# Patient Record
Sex: MALE | Race: BLACK OR AFRICAN AMERICAN | Employment: FULL TIME | URBAN - METROPOLITAN AREA
[De-identification: names, ages, dates, MRNs, and addresses within clinical notes are randomized per-mention and may not be internally consistent; named-entity substitution may affect disease eponyms.]

---

## 2017-03-03 ENCOUNTER — HOSPITAL ENCOUNTER (EMERGENCY)
Age: 52
Discharge: HOME | End: 2017-03-03
Attending: EMERGENCY MEDICINE | Admitting: EMERGENCY MEDICINE
Payer: COMMERCIAL

## 2017-03-03 VITALS
SYSTOLIC BLOOD PRESSURE: 164 MMHG | DIASTOLIC BLOOD PRESSURE: 95 MMHG | HEART RATE: 76 BPM | TEMPERATURE: 97.5 F | RESPIRATION RATE: 18 BRPM

## 2017-03-03 DIAGNOSIS — J02.9 SORE THROAT: ICD-10-CM

## 2017-03-03 DIAGNOSIS — R09.81 NASAL CONGESTION: Primary | ICD-10-CM

## 2017-03-03 DIAGNOSIS — R05.9 COUGH: ICD-10-CM

## 2017-03-03 PROCEDURE — 99282 EMERGENCY DEPT VISIT SF MDM: CPT

## 2017-03-03 RX ORDER — LORATADINE 10 MG/1
10 TABLET ORAL
Qty: 30 TAB | Refills: 1 | Status: SHIPPED | OUTPATIENT
Start: 2017-03-03 | End: 2019-09-10

## 2017-03-03 NOTE — ED PROVIDER NOTES
HPI Comments: 55yo male with history of HTN, DM, GERD, environmental allergies presents to ER complaining of sore throat that started 2 days ago stating he awoke with symptoms. Sore throat improved throughout the day but yesterday am and this am sore throat worse. Patient states he had to gargle with warm salt water this am.  Admits to nasal congestion starting several days prior to sore throat. +/- cough. Denies any body aches, fever, chills. Denies gland swelling. Patient is a 49 y.o. male presenting with sore throat. Sore Throat    Associated symptoms include cough. Pertinent negatives include no congestion, no ear pain, no headaches, no shortness of breath and no trouble swallowing. Past Medical History:   Diagnosis Date    Chest pain     GERD (gastroesophageal reflux disease)     HLD (hyperlipidemia)     HTN (hypertension) 6/30/2011       No past surgical history on file. Family History:   Problem Relation Age of Onset    Hypertension Maternal Grandmother     Cancer Maternal Grandmother     Cancer Mother     Hypertension Sister     Hypertension Brother     Cancer Maternal Grandfather        Social History     Social History    Marital status: SINGLE     Spouse name: N/A    Number of children: N/A    Years of education: N/A     Occupational History    Not on file. Social History Main Topics    Smoking status: Former Smoker     Quit date: 5/1/2016    Smokeless tobacco: Never Used    Alcohol use 2.5 oz/week     5 Cans of beer per week    Drug use: No    Sexual activity: Yes     Partners: Female     Birth control/ protection: Condom     Other Topics Concern    Not on file     Social History Narrative    Works currently-[employer], [employer]. ALLERGIES: Citrus and derivatives; Iodine; Other medication; and Pcn [penicillins]    Review of Systems   Constitutional: Negative for activity change, appetite change, chills, fatigue and fever.    HENT: Positive for rhinorrhea, sneezing and sore throat. Negative for congestion, dental problem, ear pain, facial swelling, mouth sores, nosebleeds, postnasal drip, sinus pressure, trouble swallowing and voice change. Eyes: Negative for pain, discharge, redness and itching. Respiratory: Positive for cough. Negative for shortness of breath and wheezing. Cardiovascular: Negative. Gastrointestinal: Negative. Musculoskeletal: Negative. Skin: Negative. Neurological: Negative for headaches. Hematological: Negative for adenopathy. All other systems reviewed and are negative. Vitals:    03/03/17 0633   BP: (!) 164/95   Pulse: 76   Resp: 18   Temp: 97.5 °F (36.4 °C)            Physical Exam   Constitutional: He is oriented to person, place, and time. He appears well-developed. No distress. HENT:   Right Ear: Tympanic membrane and external ear normal.   Left Ear: Tympanic membrane and external ear normal.   Mouth/Throat: Oropharynx is clear and moist.   Patient sounds nasally. No active drainage. +/- sniffles. Nasal mucosa appears slightly red and dry. Septal nasal mucosa appears slightly friable. Eyes: EOM are normal. Pupils are equal, round, and reactive to light. Right eye exhibits no discharge. Left eye exhibits no discharge. Right conjunctiva is injected. Left conjunctiva is injected. Neck: Normal range of motion. Neck supple. Cardiovascular: Normal rate, regular rhythm and normal heart sounds. Pulmonary/Chest: Effort normal and breath sounds normal.   Musculoskeletal: Normal range of motion. Lymphadenopathy:     He has no cervical adenopathy. Neurological: He is alert and oriented to person, place, and time. Skin: He is not diaphoretic. Psychiatric: He has a normal mood and affect. Nursing note and vitals reviewed.        MDM  Number of Diagnoses or Management Options  Cough:   Nasal congestion:   Sore throat:   Diagnosis management comments: 53yo male presents to ER with sore throat x 3 days, worse in the am.  Admits to nasal congestion prior to sore throat. +/- cough and intermittently productive. No viral prodrome reported. I have suspicion symptoms could be related to allergies especially given the warm, dry, windy weather for last 3-4 days. Rx for Claritin. Work note for today.   PCP referral.     ED Course       Procedures

## 2017-03-03 NOTE — LETTER
NOTIFICATION RETURN TO WORK / SCHOOL 
 
3/3/2017 6:57 AM 
 
Mr. Razia Loza [address] [id number] To Whom It May Concern: 
 
Razia Loza is currently under the care of [facility] EMERGENCY DEPT. He will return to work/school on: 03/4/17 If there are questions or concerns please have the patient contact our office.  
 
 
 
Sincerely, 
 
 
 
 
 
 
ANTONY Thakkar

## 2017-03-03 NOTE — ED TRIAGE NOTES
Patient arrived to ED with complaints of sore throat X3 days. Patient denies other symptoms at this time.

## 2017-03-03 NOTE — ED NOTES
Pt presented to  Ed w/ co sore throat for three days \" My throat has been sore for x3 days and I think its time I get it checked out. \" Pt  A/o x 4 follows commands. No medication taken by pt. Call bell in reach. No s/s of distress noted.

## 2017-04-02 ENCOUNTER — HOSPITAL ENCOUNTER (EMERGENCY)
Age: 52
Discharge: HOME | End: 2017-04-02
Attending: EMERGENCY MEDICINE
Payer: COMMERCIAL

## 2017-04-02 VITALS
BODY MASS INDEX: 34.93 KG/M2 | DIASTOLIC BLOOD PRESSURE: 100 MMHG | OXYGEN SATURATION: 98 % | WEIGHT: 244 LBS | SYSTOLIC BLOOD PRESSURE: 174 MMHG | HEART RATE: 76 BPM | HEIGHT: 70 IN | TEMPERATURE: 98.6 F | RESPIRATION RATE: 18 BRPM

## 2017-04-02 DIAGNOSIS — S39.012A LUMBAR STRAIN, INITIAL ENCOUNTER: Primary | ICD-10-CM

## 2017-04-02 DIAGNOSIS — I10 ESSENTIAL HYPERTENSION: ICD-10-CM

## 2017-04-02 PROCEDURE — 99282 EMERGENCY DEPT VISIT SF MDM: CPT

## 2017-04-02 RX ORDER — NAPROXEN 375 MG/1
375 TABLET ORAL 2 TIMES DAILY WITH MEALS
Qty: 30 TAB | Refills: 0 | Status: SHIPPED | OUTPATIENT
Start: 2017-04-02 | End: 2017-07-28

## 2017-04-02 RX ORDER — CYCLOBENZAPRINE HCL 10 MG
10 TABLET ORAL
Qty: 20 TAB | Refills: 0 | Status: SHIPPED | OUTPATIENT
Start: 2017-04-02 | End: 2017-07-28

## 2017-04-02 RX ORDER — HYDROCODONE BITARTRATE AND ACETAMINOPHEN 5; 325 MG/1; MG/1
1 TABLET ORAL
Qty: 16 TAB | Refills: 0 | Status: SHIPPED | OUTPATIENT
Start: 2017-04-02 | End: 2019-03-26

## 2017-04-02 NOTE — DISCHARGE INSTRUCTIONS
Back Strain: Care Instructions  Your Care Instructions    Back strain happens when you overstretch, or pull, a muscle in your back. You may hurt your back in an accident or when you exercise or lift something. Most back pain will get better with rest and time. You can take care of yourself at home to help your back heal.  Follow-up care is a key part of your treatment and safety. Be sure to make and go to all appointments, and call your doctor if you are having problems. It's also a good idea to know your test results and keep a list of the medicines you take. How can you care for yourself at home? · Try to stay as active as you can, but stop or reduce any activity that causes pain. · Put ice or a cold pack on the sore muscle for 10 to 20 minutes at a time to stop swelling. Try this every 1 to 2 hours for 3 days (when you are awake) or until the swelling goes down. Put a thin cloth between the ice pack and your skin. · After 2 or 3 days, apply a heating pad on low or a warm cloth to your back. Some doctors suggest that you go back and forth between hot and cold treatments. · Take pain medicines exactly as directed. ¨ If the doctor gave you a prescription medicine for pain, take it as prescribed. ¨ If you are not taking a prescription pain medicine, ask your doctor if you can take an over-the-counter medicine. · Try sleeping on your side with a pillow between your legs. Or put a pillow under your knees when you lie on your back. These measures can ease pain in your lower back. · Return to your usual level of activity slowly. When should you call for help? Call 911 anytime you think you may need emergency care. For example, call if:  · You are unable to move a leg at all. Call your doctor now or seek immediate medical care if:  · You have new or worse symptoms in your legs, belly, or buttocks. Symptoms may include:  ¨ Numbness or tingling. ¨ Weakness. ¨ Pain.   · You lose bladder or bowel control. Watch closely for changes in your health, and be sure to contact your doctor if you are not getting better as expected. Where can you learn more? Go to http://maggie-makenzie.info/. Enter B274 in the search box to learn more about \"Back Strain: Care Instructions. \"  Current as of: May 23, 2016  Content Version: 11.2  © 2453-7157 [employer]. Care instructions adapted under license by "[employer]" (which disclaims liability or warranty for this information). If you have questions about a medical condition or this instruction, always ask your healthcare professional. [place] [address] any warranty or liability for your use of this information.

## 2017-04-02 NOTE — ED PROVIDER NOTES
HPI Comments: 7:53 AM Annamaria Chaney is a 49 y.o. male with noted PMH who presents to the ED c/o lower back pain X 5 days. Pt states \"I suffer from muscle spasms\". He states that he works at [employer] where he performs heavy lifting and he does a lot of \"twisting and turning\". He states this morning he was stretching and his back \"locked\". At 4 AM this morning, the pain radiated into his legs and groin area. He denies difficulty urinating, dysuria, frequency, urgency, fever, penile pain, chills, and recreational drug use. He does mention that he has tried Flexeril for pain. No other associated symptoms or modifying factors at this time. The history is provided by the patient. Past Medical History:   Diagnosis Date    Chest pain     GERD (gastroesophageal reflux disease)     HLD (hyperlipidemia)     HTN (hypertension) 6/30/2011       History reviewed. No pertinent surgical history. Family History:   Problem Relation Age of Onset    Cancer Mother     Hypertension Maternal Grandmother     Cancer Maternal Grandmother     Hypertension Sister     Hypertension Brother     Cancer Maternal Grandfather        Social History     Social History    Marital status: SINGLE     Spouse name: N/A    Number of children: N/A    Years of education: N/A     Occupational History    Not on file. Social History Main Topics    Smoking status: Current Some Day Smoker     Last attempt to quit: 5/1/2016    Smokeless tobacco: Never Used    Alcohol use 2.5 oz/week     5 Cans of beer per week    Drug use: No    Sexual activity: Yes     Partners: Female     Birth control/ protection: Condom     Other Topics Concern    Not on file     Social History Narrative    Works currently-[employer], [employer]. ALLERGIES: Citrus and derivatives; Iodine; Other medication; and Pcn [penicillins]    Review of Systems   Constitutional: Negative for chills and fever.    HENT: Negative for congestion and sore throat. Eyes: Negative for discharge. Respiratory: Negative for cough, shortness of breath and wheezing. Cardiovascular: Negative for chest pain and leg swelling. Gastrointestinal: Negative for abdominal pain, blood in stool, diarrhea, nausea and vomiting. Genitourinary: Negative for difficulty urinating, dysuria, frequency, hematuria, penile pain and urgency. Musculoskeletal: Positive for back pain. Negative for arthralgias, myalgias and neck stiffness. Skin: Negative for rash. Neurological: Negative for seizures and headaches. Hematological: Does not bruise/bleed easily. Psychiatric/Behavioral: Negative for behavioral problems and suicidal ideas. The patient is not nervous/anxious. All other systems reviewed and are negative. Vitals:    04/02/17 0742 04/02/17 0753 04/02/17 0832   BP:   (!) 174/100   Pulse: 76     Resp: 18     Temp: 98.6 °F (37 °C)     SpO2: 98% 98%    Weight: 110.7 kg (244 lb)     Height: 5' 10\" (1.778 m)              Physical Exam   Constitutional: He is oriented to person, place, and time. He appears well-developed and well-nourished. No distress. HENT:   Head: Normocephalic and atraumatic. Eyes: No scleral icterus. Neck: Neck supple. No JVD present. Cardiovascular: Normal rate, regular rhythm and normal heart sounds. Exam reveals no gallop and no friction rub. No murmur heard. Pulmonary/Chest: Effort normal and breath sounds normal.   Abdominal: Soft. There is no tenderness. Musculoskeletal: He exhibits no tenderness. Tender over lumbar region. Neg SLR. LE motor and LT grossly preserved. Neurological: He is alert and oriented to person, place, and time. He exhibits normal muscle tone. Coordination normal.   Skin: Skin is warm and dry. No rash noted. He is not diaphoretic. Psychiatric: He has a normal mood and affect. Nursing note and vitals reviewed.        MDM  Number of Diagnoses or Management Options  Essential hypertension:   Lumbar strain, initial encounter:   Diagnosis management comments: IMP Acute lumbar strain typical of prior episodes. No \"red flag\" indications for emergent advanced imaging. Syptomatic treatment. Pt advised of elevated BP and need for f/u. ACEP guidelines recommend no emergent treatment of BP elevation in absence of symptomatic acute end organ disease. ED Course       Procedures  Vitals:  Patient Vitals for the past 12 hrs:   Temp Pulse Resp BP SpO2   04/02/17 0832 - - - (!) 174/100 -   04/02/17 0753 - - - - 98 %   04/02/17 0742 98.6 °F (37 °C) 76 18 - 98 %         Medications ordered:   Medications - No data to display      Lab findings:  No results found for this or any previous visit (from the past 12 hour(s)). Progress notes, Consult notes or additional Procedure notes:   8:26 AM I have reassessed the patient and discussed their results and diagnosis. Pt will be discharged in stable condition. Patient is to return to emergency department if any new or worsening condition. Patient understands and verbalizes agreement with plan. Disposition:  Diagnosis:   1. Lumbar strain, initial encounter    2. Essential hypertension      1) Rest with light stretching as tolerated. 2) Ice packs to low back, 20 minutes at a time, 2 to 3 times daily. 3) Follow up with your PCP for recheck in 1 week if not improved. 4) Consider chiropractic treatment for symptoms lasting more than 7 to 10 days. 5) Naproxen for inflammation  6) Flexeril for spasm  7) Norco for pain  8) BP elevated today. Take meds as prescribed and follow up with your PCP recheck in 1-2 weeks    Disposition: Discharged    Follow-up Information     None           Patient's Medications   Start Taking    CYCLOBENZAPRINE (FLEXERIL) 10 MG TABLET    Take 1 Tab by mouth three (3) times daily as needed for Muscle Spasm(s). HYDROCODONE-ACETAMINOPHEN (NORCO) 5-325 MG PER TABLET    Take 1 Tab by mouth every four (4) hours as needed for Pain.  Max Daily Amount: 6 Tabs.    NAPROXEN (NAPROSYN) 375 MG TABLET    Take 1 Tab by mouth two (2) times daily (with meals). Continue Taking    AMLODIPINE (NORVASC) 2.5 MG TABLET    Take 1 Tab by mouth daily. ASPIRIN 81 MG CHEWABLE TABLET    Take 81 mg by mouth daily. FLUTICASONE (FLONASE) 50 MCG/ACTUATION NASAL SPRAY    2 Sprays by Both Nostrils route daily. LORATADINE (CLARITIN) 10 MG TABLET    Take 1 Tab by mouth daily as needed for Allergies or Rhinitis. LOSARTAN-HYDROCHLOROTHIAZIDE (HYZAAR) 100-25 MG PER TABLET    Take 1 tablet by mouth daily. METOPROLOL SUCCINATE (TOPROL-XL) 100 MG XL TABLET    Take 1 Tab by mouth daily. OMEPRAZOLE (PRILOSEC) 40 MG CAPSULE    Take 1 Cap by mouth daily. These Medications have changed    No medications on file   Stop Taking    No medications on file     SCRIBE ATTESTATION STATEMENT  Documented by: Isidro Rowland for, and in the presence of, Vianca Guerrero MD 7:54 AM    Signed by: Pedro Luther, 04/02/17 7:54 AM    PROVIDER ATTESTATION STATEMENT  I personally performed the services described in the documentation, reviewed the documentation, as recorded by the scribe in my presence, and it accurately and completely records my words and actions.   Vianca Guerrero MD

## 2017-04-02 NOTE — LETTER
[facility] EMERGENCY DEPT 
[facility] [phone] [id number] 
[phone] Work Note April 2, 2017: To Whom It May concern: 
 
Daryl Brizuela was seen and evaluated today in the emergency room for an acute injury. Daryl Brizuela may return to work on 4/4/17. Duty restrictions as follows: limited bending, lifting, walking next 3-7 days as tolerated. Sincerely, Griffith Galeazzi, MD

## 2017-04-02 NOTE — Clinical Note
1) Rest with light stretching as tolerated. 2) Ice packs to low back, 20 minutes at a time, 2 to 3 times daily. 3) Follow up with your PCP for recheck in 1 week if not improved. 4) Consider chiropractic treatment for symptoms lasting more than 7 to 10  days. 5) Naproxen for inflammation 6) Flexeril for spasm 7) Norco for pain

## 2017-07-28 ENCOUNTER — HOSPITAL ENCOUNTER (EMERGENCY)
Age: 52
Discharge: HOME | End: 2017-07-28
Attending: EMERGENCY MEDICINE | Admitting: EMERGENCY MEDICINE
Payer: COMMERCIAL

## 2017-07-28 VITALS
HEART RATE: 75 BPM | DIASTOLIC BLOOD PRESSURE: 78 MMHG | SYSTOLIC BLOOD PRESSURE: 145 MMHG | TEMPERATURE: 97.9 F | RESPIRATION RATE: 18 BRPM | OXYGEN SATURATION: 96 %

## 2017-07-28 DIAGNOSIS — S39.012A LUMBAR STRAIN, INITIAL ENCOUNTER: Primary | ICD-10-CM

## 2017-07-28 PROCEDURE — 99283 EMERGENCY DEPT VISIT LOW MDM: CPT

## 2017-07-28 PROCEDURE — 74011250636 HC RX REV CODE- 250/636: Performed by: EMERGENCY MEDICINE

## 2017-07-28 PROCEDURE — 96372 THER/PROPH/DIAG INJ SC/IM: CPT

## 2017-07-28 PROCEDURE — 74011250637 HC RX REV CODE- 250/637: Performed by: EMERGENCY MEDICINE

## 2017-07-28 RX ORDER — CYCLOBENZAPRINE HCL 10 MG
10 TABLET ORAL
Qty: 15 TAB | Refills: 0 | Status: SHIPPED | OUTPATIENT
Start: 2017-07-28 | End: 2019-03-26

## 2017-07-28 RX ORDER — CYCLOBENZAPRINE HCL 10 MG
10 TABLET ORAL
Status: COMPLETED | OUTPATIENT
Start: 2017-07-28 | End: 2017-07-28

## 2017-07-28 RX ORDER — NAPROXEN 500 MG/1
500 TABLET ORAL 2 TIMES DAILY WITH MEALS
Qty: 20 TAB | Refills: 0 | Status: SHIPPED | OUTPATIENT
Start: 2017-07-28 | End: 2017-08-07

## 2017-07-28 RX ORDER — KETOROLAC TROMETHAMINE 30 MG/ML
30 INJECTION, SOLUTION INTRAMUSCULAR; INTRAVENOUS
Status: COMPLETED | OUTPATIENT
Start: 2017-07-28 | End: 2017-07-28

## 2017-07-28 RX ADMIN — KETOROLAC TROMETHAMINE 30 MG: 30 INJECTION, SOLUTION INTRAMUSCULAR at 06:13

## 2017-07-28 RX ADMIN — CYCLOBENZAPRINE HYDROCHLORIDE 10 MG: 10 TABLET, FILM COATED ORAL at 06:13

## 2017-07-28 NOTE — DISCHARGE INSTRUCTIONS
Back Strain: Care Instructions  Your Care Instructions    Back strain happens when you overstretch, or pull, a muscle in your back. You may hurt your back in an accident or when you exercise or lift something. Most back pain will get better with rest and time. You can take care of yourself at home to help your back heal.  Follow-up care is a key part of your treatment and safety. Be sure to make and go to all appointments, and call your doctor if you are having problems. It's also a good idea to know your test results and keep a list of the medicines you take. How can you care for yourself at home? · Try to stay as active as you can, but stop or reduce any activity that causes pain. · Put ice or a cold pack on the sore muscle for 10 to 20 minutes at a time to stop swelling. Try this every 1 to 2 hours for 3 days (when you are awake) or until the swelling goes down. Put a thin cloth between the ice pack and your skin. · After 2 or 3 days, apply a heating pad on low or a warm cloth to your back. Some doctors suggest that you go back and forth between hot and cold treatments. · Take pain medicines exactly as directed. ¨ If the doctor gave you a prescription medicine for pain, take it as prescribed. ¨ If you are not taking a prescription pain medicine, ask your doctor if you can take an over-the-counter medicine. · Try sleeping on your side with a pillow between your legs. Or put a pillow under your knees when you lie on your back. These measures can ease pain in your lower back. · Return to your usual level of activity slowly. When should you call for help? Call 911 anytime you think you may need emergency care. For example, call if:  · You are unable to move a leg at all. Call your doctor now or seek immediate medical care if:  · You have new or worse symptoms in your legs, belly, or buttocks. Symptoms may include:  ¨ Numbness or tingling. ¨ Weakness. ¨ Pain.   · You lose bladder or bowel control. Watch closely for changes in your health, and be sure to contact your doctor if you are not getting better as expected. Where can you learn more? Go to http://maggie-makenzie.info/. Enter L669 in the search box to learn more about \"Back Strain: Care Instructions. \"  Current as of: March 21, 2017  Content Version: 11.3  © 6292-5994 [employer]. Care instructions adapted under license by [employer] (which disclaims liability or warranty for this information). If you have questions about a medical condition or this instruction, always ask your healthcare professional. [address] [place] [address] any warranty or liability for your use of this information.

## 2017-07-28 NOTE — ED PROVIDER NOTES
HPI Comments:   5:47 AM   Sierra Montes is a 47 y.o. male with hx of chronic back pain and muscle spasms presents to ED C/O intermittent back pain episodes worsening for approximately 1 week. Pt states he ran out of muscle relaxer and Naprosyn 5 days ago. Pt works at [employer]. He states he has PCP f/u on 8/5/2017 for medication refill. Other PMHx include HTN, GERD, HLD, and CP. No PSHx noted. Pt denies any other sxs or complaints. The history is provided by the patient. No  was used. Past Medical History:   Diagnosis Date    Chest pain     GERD (gastroesophageal reflux disease)     HLD (hyperlipidemia)     HTN (hypertension) 6/30/2011       History reviewed. No pertinent surgical history. Family History:   Problem Relation Age of Onset    Cancer Mother     Hypertension Maternal Grandmother     Cancer Maternal Grandmother     Hypertension Sister     Hypertension Brother     Cancer Maternal Grandfather        Social History     Social History    Marital status: SINGLE     Spouse name: N/A    Number of children: N/A    Years of education: N/A     Occupational History    Not on file. Social History Main Topics    Smoking status: Current Some Day Smoker     Last attempt to quit: 5/1/2016    Smokeless tobacco: Never Used    Alcohol use 2.5 oz/week     5 Cans of beer per week    Drug use: No    Sexual activity: Yes     Partners: Female     Birth control/ protection: Condom     Other Topics Concern    Not on file     Social History Narrative    Works currently-[employer], [employer]. ALLERGIES: Citrus and derivatives; Iodine; Other medication; and Pcn [penicillins]    Review of Systems   Constitutional: Negative for activity change, appetite change, diaphoresis and fever. HENT: Negative for congestion, dental problem, ear pain, hearing loss, nosebleeds, postnasal drip, sinus pressure, sneezing and tinnitus.     Eyes: Negative for photophobia, discharge, redness and visual disturbance. Respiratory: Negative for cough, choking, shortness of breath, wheezing and stridor. Cardiovascular: Negative for chest pain, palpitations and leg swelling. Gastrointestinal: Negative for abdominal distention, abdominal pain, anal bleeding and blood in stool. Genitourinary: Negative for decreased urine volume, difficulty urinating, discharge, dysuria, frequency, hematuria, penile swelling, scrotal swelling, testicular pain and urgency. Musculoskeletal: Positive for back pain. Negative for arthralgias, gait problem, joint swelling, myalgias and neck pain. Skin: Negative for color change and pallor. Neurological: Negative for dizziness, tremors, seizures, syncope and headaches. Hematological: Negative for adenopathy. Does not bruise/bleed easily. Psychiatric/Behavioral: Negative for agitation, behavioral problems, confusion and hallucinations. The patient is not nervous/anxious. Vitals:    07/28/17 0544   BP: 153/83   Pulse: 75   Resp: 18   Temp: 97.9 °F (36.6 °C)   SpO2: 97%            Physical Exam   Constitutional: He is oriented to person, place, and time. He appears well-developed and well-nourished. HENT:   Head: Normocephalic and atraumatic. Right Ear: External ear normal.   Left Ear: External ear normal.   Nose: Nose normal.   Mouth/Throat: Oropharynx is clear and moist.   Eyes: Conjunctivae and EOM are normal. Pupils are equal, round, and reactive to light. Right eye exhibits no discharge. No scleral icterus. Neck: Normal range of motion. Neck supple. No JVD present. No thyromegaly present. Cardiovascular: Normal rate, regular rhythm and intact distal pulses. Exam reveals no gallop and no friction rub. No murmur heard. Pulmonary/Chest: No respiratory distress. He has no wheezes. He has no rales. He exhibits no tenderness. Abdominal: He exhibits no distension and no mass. There is no tenderness.  There is no rebound and no guarding. Musculoskeletal: Normal range of motion. He exhibits no edema. Lumbar back: He exhibits tenderness (bilaterally). Lymphadenopathy:     He has no cervical adenopathy. Neurological: He is alert and oriented to person, place, and time. No cranial nerve deficit. Coordination normal.   Skin: Skin is warm and dry. No rash noted. No erythema. Psychiatric: He has a normal mood and affect. His behavior is normal. Judgment normal.   Nursing note and vitals reviewed. MDM  Number of Diagnoses or Management Options    ED Course       Procedures  Vitals:  Patient Vitals for the past 12 hrs:   Temp Pulse Resp BP SpO2   07/28/17 0544 97.9 °F (36.6 °C) 75 18 153/83 97 %       Medications Ordered:  Medications - No data to display      Lab Findings:  No results found for this or any previous visit (from the past 12 hour(s)). X-ray, CT or radiology findings or impressions:  No results found. Diagnosis: No diagnosis found. Disposition: Discharge     Follow-up Information     None          Patient's Medications   Start Taking    No medications on file   Continue Taking    AMLODIPINE (NORVASC) 2.5 MG TABLET    Take 1 Tab by mouth daily. ASPIRIN 81 MG CHEWABLE TABLET    Take 81 mg by mouth daily. CYCLOBENZAPRINE (FLEXERIL) 10 MG TABLET    Take 1 Tab by mouth three (3) times daily as needed for Muscle Spasm(s). FLUTICASONE (FLONASE) 50 MCG/ACTUATION NASAL SPRAY    2 Sprays by Both Nostrils route daily. HYDROCODONE-ACETAMINOPHEN (NORCO) 5-325 MG PER TABLET    Take 1 Tab by mouth every four (4) hours as needed for Pain. Max Daily Amount: 6 Tabs. LORATADINE (CLARITIN) 10 MG TABLET    Take 1 Tab by mouth daily as needed for Allergies or Rhinitis. LOSARTAN-HYDROCHLOROTHIAZIDE (HYZAAR) 100-25 MG PER TABLET    Take 1 tablet by mouth daily. METOPROLOL SUCCINATE (TOPROL-XL) 100 MG XL TABLET    Take 1 Tab by mouth daily.     NAPROXEN (NAPROSYN) 375 MG TABLET    Take 1 Tab by mouth two (2) times daily (with meals). OMEPRAZOLE (PRILOSEC) 40 MG CAPSULE    Take 1 Cap by mouth daily. These Medications have changed    No medications on file   Stop Taking    No medications on file       Scribe Attestation      Dorene Man, acting as a scribe for and in the presence of Lola Brice MD      July 28, 2017 at 5:40 AM       Provider Attestation:      I personally performed the services described in the documentation, reviewed the documentation, as recorded by the scribe in my presence, and it accurately and completely records my words and actions.      Lola Brice MD      Signed by: Pedro Sexton, July 28, 2017 at 5:40 AM

## 2017-07-28 NOTE — ED TRIAGE NOTES
Pt complains of chronic  back spasms, he reports he ran out of his muscle relaxer and naprosyn on monday

## 2018-02-06 ENCOUNTER — HOSPITAL ENCOUNTER (EMERGENCY)
Age: 53
Discharge: HOME | End: 2018-02-06
Attending: EMERGENCY MEDICINE
Payer: COMMERCIAL

## 2018-02-06 VITALS
HEART RATE: 72 BPM | HEIGHT: 70 IN | RESPIRATION RATE: 18 BRPM | SYSTOLIC BLOOD PRESSURE: 172 MMHG | TEMPERATURE: 98.2 F | OXYGEN SATURATION: 98 % | DIASTOLIC BLOOD PRESSURE: 101 MMHG

## 2018-02-06 DIAGNOSIS — K02.9 TOOTH DECAY: Primary | ICD-10-CM

## 2018-02-06 DIAGNOSIS — K08.89 PAIN, DENTAL: ICD-10-CM

## 2018-02-06 PROCEDURE — 99282 EMERGENCY DEPT VISIT SF MDM: CPT

## 2018-02-06 RX ORDER — CLINDAMYCIN HYDROCHLORIDE 150 MG/1
300 CAPSULE ORAL 3 TIMES DAILY
Qty: 42 CAP | Refills: 0 | Status: SHIPPED | OUTPATIENT
Start: 2018-02-06 | End: 2018-02-13

## 2018-02-06 RX ORDER — TRAMADOL HYDROCHLORIDE 50 MG/1
50 TABLET ORAL
Qty: 12 TAB | Refills: 0 | Status: SHIPPED | OUTPATIENT
Start: 2018-02-06 | End: 2019-03-26

## 2018-02-06 NOTE — DISCHARGE INSTRUCTIONS
Tooth Decay: Care Instructions  Your Care Instructions    Tooth decay is damage to a tooth caused by plaque. Plaque is a thin film of bacteria that sticks to the teeth above and below the gum line. If plaque isn't removed from the teeth, it can build up and harden into tartar. The bacteria in plaque and tartar use sugars in food to make acids. These acids can cause tooth decay and gum disease. Any part of your tooth can decay, from the roots below the gum line to the chewing surface. Decay can affect the outer layer (enamel) or inner layer (dentin) of your teeth. The deeper the decay, the worse the damage. Untreated tooth decay will get worse and may lead to tooth loss. If you have a small hole (cavity) in your tooth, your dentist can repair it by removing the decay and filling the hole. If you have deeper decay, you may need more treatment. A very badly damaged tooth may have to be removed. Follow-up care is a key part of your treatment and safety. Be sure to make and go to all appointments, and call your dentist if you are having problems. It's also a good idea to know your test results and keep a list of the medicines you take. How can you care for yourself at home? If you have pain:  · Take an over-the-counter pain medicine, such as acetaminophen (Tylenol), ibuprofen (Advil, Motrin), or naproxen (Aleve). Be safe with medicines. Read and follow all instructions on the label. ¨ Do not take two or more pain medicines at the same time unless the doctor told you to. Many pain medicines have acetaminophen, which is Tylenol. Too much acetaminophen (Tylenol) can be harmful. · Put ice or a cold pack on your cheek over the tooth for 10 to 15 minutes at a time. Put a thin cloth between the ice and your skin. To prevent tooth decay  · Brush teeth twice a day, and floss once a day. Brushing with fluoride toothpaste and flossing may be enough to reverse early decay.   · Use a toothbrush with soft, rounded-end bristles and a head that is small enough to reach all parts of your teeth and mouth. Replace your toothbrush every 3 or 4 months. You may also use an electric toothbrush that has rotating and oscillating (back-and-forth) action. · Ask your dentist about having fluoride treatments at the dental office. · Brush your tongue to help get rid of bacteria. · Eat healthy foods that include whole grains, vegetables, and fruits. · Have your teeth cleaned by a professional at least two times a year. · Do not smoke or use smokeless tobacco. Tobacco can make tooth decay worse. When should you call for help? Call 911 anytime you think you may need emergency care. For example, call if:  ? · You have trouble breathing. ?Call your dentist now or seek immediate medical care if:  ? · You have new or worse symptoms of infection, such as:  ¨ Increased pain, swelling, warmth, or redness. ¨ Red streaks leading from the area. ¨ Pus draining from the area. ¨ A fever. ? Watch closely for changes in your health, and be sure to contact your doctor if:  ? · You do not get better as expected. Where can you learn more? Go to http://maggie-makenzie.info/. Enter G543 in the search box to learn more about \"Tooth Decay: Care Instructions. \"  Current as of: May 12, 2017  Content Version: 11.4  © 1313-6635 [employer]. Care instructions adapted under license by [employer] (which disclaims liability or warranty for this information). If you have questions about a medical condition or this instruction, always ask your healthcare professional. [place] [address] any warranty or liability for your use of this information.

## 2018-02-06 NOTE — ED PROVIDER NOTES
EMERGENCY DEPARTMENT HISTORY AND PHYSICAL EXAM    Date: 2/6/2018  Patient Name: Janeece Monday    History of Presenting Illness     No chief complaint on file. History Provided By: Patient    Chief Complaint: toothache  Duration: 2 Days  Timing:  Gradual  Location: right upper  Quality: Aching  Severity: Moderate  Modifying Factors: chewing makes pain worse  Associated Symptoms: denies any other associated signs or symptoms      Additional History (Context): Janeece Monday is a 47 y.o. male with hypertension and hyperlipidemia who presents with right upper toothache x 2 days onset while chewing. Taking advil and using oragel w/o relief. No fever, chills, HA or any other complaints. Has dentist.    PCP: Roque Gonzalez MD    Current Outpatient Prescriptions   Medication Sig Dispense Refill    cyclobenzaprine (FLEXERIL) 10 mg tablet Take 1 Tab by mouth three (3) times daily as needed for Muscle Spasm(s). 15 Tab 0    HYDROcodone-acetaminophen (NORCO) 5-325 mg per tablet Take 1 Tab by mouth every four (4) hours as needed for Pain. Max Daily Amount: 6 Tabs. 16 Tab 0    loratadine (CLARITIN) 10 mg tablet Take 1 Tab by mouth daily as needed for Allergies or Rhinitis. 30 Tab 1    fluticasone (FLONASE) 50 mcg/actuation nasal spray 2 Sprays by Both Nostrils route daily.  metoprolol succinate (TOPROL-XL) 100 mg XL tablet Take 1 Tab by mouth daily. 30 Tab 3    amLODIPine (NORVASC) 2.5 mg tablet Take 1 Tab by mouth daily. 30 Tab 3    losartan-hydrochlorothiazide (HYZAAR) 100-25 mg per tablet Take 1 tablet by mouth daily. 30 tablet 5    aspirin 81 mg chewable tablet Take 81 mg by mouth daily.  omeprazole (PRILOSEC) 40 mg capsule Take 1 Cap by mouth daily.  30 Cap 2       Past History     Past Medical History:  Past Medical History:   Diagnosis Date    Chest pain     GERD (gastroesophageal reflux disease)     HLD (hyperlipidemia)     HTN (hypertension) 6/30/2011       Past Surgical History:  No past surgical history on file. Family History:  Family History   Problem Relation Age of Onset    Cancer Mother     Hypertension Maternal Grandmother     Cancer Maternal Grandmother     Hypertension Sister     Hypertension Brother     Cancer Maternal Grandfather        Social History:  Social History   Substance Use Topics    Smoking status: Current Some Day Smoker     Last attempt to quit: 5/1/2016    Smokeless tobacco: Never Used    Alcohol use 2.5 oz/week     5 Cans of beer per week       Allergies: Allergies   Allergen Reactions    Pcn [Penicillins] Anaphylaxis    Citrus And Derivatives Other (comments)     Patient complained of swelling of throat and went to ED    Iodine Other (comments)    Other Medication Other (comments)     MOLD         Review of Systems   Review of Systems   Constitutional: Negative for fever. HENT: Positive for dental problem. Neurological: Negative. All other systems reviewed and are negative. All Other Systems Negative  Physical Exam     Vitals:    02/06/18 1001   BP: (!) 172/101   Pulse: 72   Resp: 18   Temp: 98.2 °F (36.8 °C)   SpO2: 98%   Height: 5' 10\" (1.778 m)     Physical Exam   Constitutional: He appears well-developed and well-nourished. No distress. HENT:   Head: Normocephalic and atraumatic. Right Ear: Hearing and external ear normal.   Left Ear: Hearing and external ear normal.   Nose: Nose normal.   Mouth/Throat: Uvula is midline and oropharynx is clear and moist. No oropharyngeal exudate. Eyes: Conjunctivae are normal.   Neck: Normal range of motion. Neck supple. Neurological: He is alert. Skin: Skin is warm and dry. He is not diaphoretic. Psychiatric: He has a normal mood and affect. Diagnostic Study Results     Labs -   No results found for this or any previous visit (from the past 12 hour(s)).     Radiologic Studies -   No orders to display     CT Results  (Last 48 hours)    None        CXR Results  (Last 48 hours)    None Medical Decision Making   I am the first provider for this patient. I reviewed the vital signs, available nursing notes, past medical history, past surgical history, family history and social history. Vital Signs-Reviewed the patient's vital signs. Pulse Oximetry Analysis - 98% on RA      Procedures:  Procedures    Provider Notes (Medical Decision Making):     MED RECONCILIATION:  No current facility-administered medications for this encounter. Current Outpatient Prescriptions   Medication Sig    cyclobenzaprine (FLEXERIL) 10 mg tablet Take 1 Tab by mouth three (3) times daily as needed for Muscle Spasm(s).  HYDROcodone-acetaminophen (NORCO) 5-325 mg per tablet Take 1 Tab by mouth every four (4) hours as needed for Pain. Max Daily Amount: 6 Tabs.  loratadine (CLARITIN) 10 mg tablet Take 1 Tab by mouth daily as needed for Allergies or Rhinitis.  fluticasone (FLONASE) 50 mcg/actuation nasal spray 2 Sprays by Both Nostrils route daily.  metoprolol succinate (TOPROL-XL) 100 mg XL tablet Take 1 Tab by mouth daily.  amLODIPine (NORVASC) 2.5 mg tablet Take 1 Tab by mouth daily.  losartan-hydrochlorothiazide (HYZAAR) 100-25 mg per tablet Take 1 tablet by mouth daily.  aspirin 81 mg chewable tablet Take 81 mg by mouth daily.  omeprazole (PRILOSEC) 40 mg capsule Take 1 Cap by mouth daily. Disposition:  discharged    DISCHARGE NOTE:     Pt has been reexamined. Patient has no new complaints, changes, or physical findings. Care plan outlined and precautions discussed. All medications were reviewed with the patient; will d/c home with elvin erickson. All of pt's questions and concerns were addressed. Patient was instructed and agrees to follow up with dentist, as well as to return to the ED upon further deterioration. Patient is ready to go home.     Follow-up Information     None          Current Discharge Medication List          Diagnosis     Clinical Impression: No diagnosis found.

## 2019-03-26 ENCOUNTER — APPOINTMENT (OUTPATIENT)
Dept: CT IMAGING | Age: 54
End: 2019-03-26
Attending: EMERGENCY MEDICINE
Payer: COMMERCIAL

## 2019-03-26 ENCOUNTER — HOSPITAL ENCOUNTER (EMERGENCY)
Age: 54
Discharge: HOME | End: 2019-03-26
Attending: EMERGENCY MEDICINE
Payer: COMMERCIAL

## 2019-03-26 VITALS
HEIGHT: 70 IN | TEMPERATURE: 97.7 F | RESPIRATION RATE: 12 BRPM | WEIGHT: 240 LBS | SYSTOLIC BLOOD PRESSURE: 180 MMHG | DIASTOLIC BLOOD PRESSURE: 98 MMHG | OXYGEN SATURATION: 97 % | BODY MASS INDEX: 34.36 KG/M2 | HEART RATE: 50 BPM

## 2019-03-26 DIAGNOSIS — R42 POSTURAL DIZZINESS WITH PRESYNCOPE: ICD-10-CM

## 2019-03-26 DIAGNOSIS — I10 ESSENTIAL HYPERTENSION: ICD-10-CM

## 2019-03-26 DIAGNOSIS — R51.9 ACUTE NONINTRACTABLE HEADACHE, UNSPECIFIED HEADACHE TYPE: Primary | ICD-10-CM

## 2019-03-26 DIAGNOSIS — R55 POSTURAL DIZZINESS WITH PRESYNCOPE: ICD-10-CM

## 2019-03-26 LAB
ANION GAP SERPL CALC-SCNC: 3 MMOL/L (ref 3–18)
ATRIAL RATE: 62 BPM
BASOPHILS # BLD: 0 K/UL (ref 0–0.1)
BASOPHILS NFR BLD: 1 % (ref 0–2)
BUN SERPL-MCNC: 15 MG/DL (ref 7–18)
BUN/CREAT SERPL: 12 (ref 12–20)
CALCIUM SERPL-MCNC: 9 MG/DL (ref 8.5–10.1)
CALCULATED P AXIS, ECG09: 30 DEGREES
CALCULATED R AXIS, ECG10: 56 DEGREES
CALCULATED T AXIS, ECG11: 115 DEGREES
CHLORIDE SERPL-SCNC: 106 MMOL/L (ref 100–108)
CK MB CFR SERPL CALC: 1.4 % (ref 0–4)
CK MB SERPL-MCNC: 9.3 NG/ML (ref 5–25)
CK SERPL-CCNC: 655 U/L (ref 39–308)
CO2 SERPL-SCNC: 29 MMOL/L (ref 21–32)
CREAT SERPL-MCNC: 1.25 MG/DL (ref 0.6–1.3)
DIAGNOSIS, 93000: NORMAL
DIFFERENTIAL METHOD BLD: ABNORMAL
EOSINOPHIL # BLD: 0.2 K/UL (ref 0–0.4)
EOSINOPHIL NFR BLD: 4 % (ref 0–5)
ERYTHROCYTE [DISTWIDTH] IN BLOOD BY AUTOMATED COUNT: 12.9 % (ref 11.6–14.5)
GLUCOSE SERPL-MCNC: 88 MG/DL (ref 74–99)
HCT VFR BLD AUTO: 50.2 % (ref 36–48)
HGB BLD-MCNC: 16.8 G/DL (ref 13–16)
LYMPHOCYTES # BLD: 1.2 K/UL (ref 0.9–3.6)
LYMPHOCYTES NFR BLD: 28 % (ref 21–52)
MCH RBC QN AUTO: 29.5 PG (ref 24–34)
MCHC RBC AUTO-ENTMCNC: 33.5 G/DL (ref 31–37)
MCV RBC AUTO: 88.1 FL (ref 74–97)
MONOCYTES # BLD: 0.3 K/UL (ref 0.05–1.2)
MONOCYTES NFR BLD: 8 % (ref 3–10)
NEUTS SEG # BLD: 2.6 K/UL (ref 1.8–8)
NEUTS SEG NFR BLD: 59 % (ref 40–73)
P-R INTERVAL, ECG05: 144 MS
PLATELET # BLD AUTO: 122 K/UL (ref 135–420)
PMV BLD AUTO: 13 FL (ref 9.2–11.8)
POTASSIUM SERPL-SCNC: 3.7 MMOL/L (ref 3.5–5.5)
Q-T INTERVAL, ECG07: 408 MS
QRS DURATION, ECG06: 90 MS
QTC CALCULATION (BEZET), ECG08: 414 MS
RBC # BLD AUTO: 5.7 M/UL (ref 4.7–5.5)
SODIUM SERPL-SCNC: 138 MMOL/L (ref 136–145)
TROPONIN I SERPL-MCNC: 0.02 NG/ML (ref 0–0.04)
VENTRICULAR RATE, ECG03: 62 BPM
WBC # BLD AUTO: 4.4 K/UL (ref 4.6–13.2)

## 2019-03-26 PROCEDURE — 82550 ASSAY OF CK (CPK): CPT

## 2019-03-26 PROCEDURE — 70450 CT HEAD/BRAIN W/O DYE: CPT

## 2019-03-26 PROCEDURE — 74011250637 HC RX REV CODE- 250/637: Performed by: EMERGENCY MEDICINE

## 2019-03-26 PROCEDURE — 99285 EMERGENCY DEPT VISIT HI MDM: CPT

## 2019-03-26 PROCEDURE — 93005 ELECTROCARDIOGRAM TRACING: CPT

## 2019-03-26 PROCEDURE — 85025 COMPLETE CBC W/AUTO DIFF WBC: CPT

## 2019-03-26 PROCEDURE — 80048 BASIC METABOLIC PNL TOTAL CA: CPT

## 2019-03-26 RX ORDER — AMLODIPINE BESYLATE 5 MG/1
5 TABLET ORAL
Status: COMPLETED | OUTPATIENT
Start: 2019-03-26 | End: 2019-03-26

## 2019-03-26 RX ORDER — LABETALOL HCL 20 MG/4 ML
20 SYRINGE (ML) INTRAVENOUS
Status: DISCONTINUED | OUTPATIENT
Start: 2019-03-26 | End: 2019-03-26

## 2019-03-26 RX ORDER — METOPROLOL TARTRATE 25 MG/1
25 TABLET, FILM COATED ORAL 2 TIMES DAILY
Qty: 30 TAB | Refills: 0 | Status: SHIPPED | OUTPATIENT
Start: 2019-03-26 | End: 2019-09-10

## 2019-03-26 RX ORDER — METOPROLOL TARTRATE 25 MG/1
25 TABLET, FILM COATED ORAL
Status: COMPLETED | OUTPATIENT
Start: 2019-03-26 | End: 2019-03-26

## 2019-03-26 RX ORDER — AMLODIPINE BESYLATE 2.5 MG/1
2.5 TABLET ORAL DAILY
Qty: 30 TAB | Refills: 0 | Status: SHIPPED | OUTPATIENT
Start: 2019-03-26 | End: 2019-09-10

## 2019-03-26 RX ADMIN — AMLODIPINE BESYLATE 5 MG: 5 TABLET ORAL at 07:52

## 2019-03-26 RX ADMIN — METOPROLOL TARTRATE 25 MG: 25 TABLET ORAL at 07:52

## 2019-03-26 NOTE — ED PROVIDER NOTES
EMERGENCY DEPARTMENT HISTORY AND PHYSICAL EXAM 
 
Date: 3/26/2019 Patient Name: Tj Choi History of Presenting Illness Chief Complaint Patient presents with  Dizziness Additional History (Context): Tj Choi is a 47 y.o. male who presents with dizziness and headache earlier today while he was driving. Patient described the pain as a mild throbbing pain with him feeling lightheaded as if he was going to pass out. Patient denies any focal weakness or sensory deficits associated with this. Patient denies any chest pain, shortness of breath, or changes in urine production. Patient states he does have a history of hypertension but ran out of his meds and has not been taking for several weeks. Patient states his medications included Norvasc 2.5 mg oral daily and metoprolol 25 mg twice daily. PCP: Heather Fields MD 
 
Current Outpatient Medications Medication Sig Dispense Refill  amLODIPine (NORVASC) 2.5 mg tablet Take 1 Tab by mouth daily. 30 Tab 0  
 metoprolol tartrate (LOPRESSOR) 25 mg tablet Take 1 Tab by mouth two (2) times a day. 30 Tab 0  
 loratadine (CLARITIN) 10 mg tablet Take 1 Tab by mouth daily as needed for Allergies or Rhinitis. 30 Tab 1  
 fluticasone (FLONASE) 50 mcg/actuation nasal spray 2 Sprays by Both Nostrils route daily.  metoprolol succinate (TOPROL-XL) 100 mg XL tablet Take 1 Tab by mouth daily. 30 Tab 3  
 losartan-hydrochlorothiazide (HYZAAR) 100-25 mg per tablet Take 1 tablet by mouth daily. 30 tablet 5  
 aspirin 81 mg chewable tablet Take 81 mg by mouth daily.  omeprazole (PRILOSEC) 40 mg capsule Take 1 Cap by mouth daily. 30 Cap 2 Past History Past Medical History: 
Past Medical History:  
Diagnosis Date  Chest pain  GERD (gastroesophageal reflux disease)  HLD (hyperlipidemia)  HTN (hypertension) 6/30/2011 Past Surgical History: 
History reviewed. No pertinent surgical history. Family History: Family History Problem Relation Age of Onset  Cancer Mother  Hypertension Maternal Grandmother  Cancer Maternal Grandmother  Hypertension Sister  Hypertension Brother  Cancer Maternal Grandfather Social History: 
Social History Tobacco Use  Smoking status: Current Some Day Smoker Packs/day: 0.25 Last attempt to quit: 2016 Years since quittin.9  Smokeless tobacco: Never Used  Tobacco comment: 2 - 4 black and milds Substance Use Topics  Alcohol use: Yes Alcohol/week: 2.5 oz Types: 5 Cans of beer per week  Drug use: No  
 
 
Allergies: Allergies Allergen Reactions  Pcn [Penicillins] Anaphylaxis  Citrus And Derivatives Other (comments) Patient complained of swelling of throat and went to ED  Iodine Other (comments)  Other Medication Other (comments) MOLD Review of Systems Review of Systems Respiratory: Negative for chest tightness and shortness of breath. Cardiovascular: Negative for chest pain. Neurological: Positive for headaches. All other systems reviewed and are negative. Physical Exam  
 
Visit Vitals BP (!) 193/108 Pulse (!) 53 Temp 97.7 °F (36.5 °C) Resp 11 Ht 5' 10\" (1.778 m) Wt 108.9 kg (240 lb) SpO2 97% BMI 34.44 kg/m² Physical Exam: 
General - Well appearing, Awake Eyes - No conjunctival injection ENT - Moist Mucous Membranes, Neck - Trachea Midline Lymph Nodes - No lymphadenopathy Cardiovascular - RRR no m/r/g, no JVD, no carotid bruits Lungs - Clear to auscltation, no use of acessory muscles, no crackles or wheezes. Skin - No rashes, skin warm and dry, no erythematous areas Psychiatry - Normal Affect, No SI Abdomen - Normal bowel sounds, abdomen soft and nontender Genito Urinary  Genital exam not performed since complaints not related. Extremeties - No gross deformities edema, cyanosis or clubbing Musculo Skeletal - 5/5 strength, normal range of motion, no swollen joints. Neurological  Alert and oriented x 3, CN 2-12 grossly intact, Strength and Sensation intact Diagnostic Study Results Labs - Recent Results (from the past 12 hour(s)) EKG, 12 LEAD, INITIAL Collection Time: 03/26/19  6:26 AM  
Result Value Ref Range Ventricular Rate 62 BPM  
 Atrial Rate 62 BPM  
 P-R Interval 144 ms QRS Duration 90 ms Q-T Interval 408 ms QTC Calculation (Bezet) 414 ms Calculated P Axis 30 degrees Calculated R Axis 56 degrees Calculated T Axis 115 degrees Diagnosis Normal sinus rhythm T wave abnormality, consider lateral ischemia Abnormal ECG When compared with ECG of 06-JAN-2016 05:50, 
T wave inversion now evident in Lateral leads CBC WITH AUTOMATED DIFF Collection Time: 03/26/19  6:27 AM  
Result Value Ref Range WBC 4.4 (L) 4.6 - 13.2 K/uL  
 RBC 5.70 (H) 4.70 - 5.50 M/uL  
 HGB 16.8 (H) 13.0 - 16.0 g/dL HCT 50.2 (H) 36.0 - 48.0 % MCV 88.1 74.0 - 97.0 FL  
 MCH 29.5 24.0 - 34.0 PG  
 MCHC 33.5 31.0 - 37.0 g/dL  
 RDW 12.9 11.6 - 14.5 % PLATELET 131 (L) 267 - 420 K/uL MPV 13.0 (H) 9.2 - 11.8 FL  
 NEUTROPHILS 59 40 - 73 % LYMPHOCYTES 28 21 - 52 % MONOCYTES 8 3 - 10 % EOSINOPHILS 4 0 - 5 % BASOPHILS 1 0 - 2 %  
 ABS. NEUTROPHILS 2.6 1.8 - 8.0 K/UL  
 ABS. LYMPHOCYTES 1.2 0.9 - 3.6 K/UL  
 ABS. MONOCYTES 0.3 0.05 - 1.2 K/UL  
 ABS. EOSINOPHILS 0.2 0.0 - 0.4 K/UL  
 ABS. BASOPHILS 0.0 0.0 - 0.1 K/UL  
 DF AUTOMATED METABOLIC PANEL, BASIC Collection Time: 03/26/19  6:27 AM  
Result Value Ref Range Sodium 138 136 - 145 mmol/L Potassium 3.7 3.5 - 5.5 mmol/L Chloride 106 100 - 108 mmol/L  
 CO2 29 21 - 32 mmol/L Anion gap 3 3.0 - 18 mmol/L Glucose 88 74 - 99 mg/dL BUN 15 7.0 - 18 MG/DL Creatinine 1.25 0.6 - 1.3 MG/DL  
 BUN/Creatinine ratio 12 12 - 20 GFR est AA >60 >60 ml/min/1.73m2 GFR est non-AA >60 >60 ml/min/1.73m2 Calcium 9.0 8.5 - 10.1 MG/DL  
CARDIAC PANEL,(CK, CKMB & TROPONIN) Collection Time: 03/26/19  6:27 AM  
Result Value Ref Range  (H) 39 - 308 U/L  
 CK - MB 9.3 (H) <3.6 ng/ml CK-MB Index 1.4 0.0 - 4.0 % Troponin-I, QT 0.02 0.0 - 0.045 NG/ML Radiologic Studies -  
CT HEAD WO CONT Final Result IMPRESSION: No acute intracranial abnormalities by CT. EKG: Normal sinus rhythm 62 bpm.  . Normal axis. No ST elevation. As read by myself. Medical Decision Making I am the first provider for this patient. I reviewed the vital signs, available nursing notes, past medical history, past surgical history, family history and social history. Vital Signs-Reviewed the patient's vital signs. Provider Notes (Medical Decision Making): MDM Number of Diagnoses or Management Options Acute nonintractable headache, unspecified headache type:  
Essential hypertension:  
Postural dizziness with presyncope:  
Diagnosis management comments: Patient has hypertension which I will treat with his missed medications including Norvasc and metoprolol. I will evaluate patient with EKG, labs, CT head for signs of hypertensive emergency since his blood pressure is high and is he is having symptoms. Patient declines any pain medication at this time as his headache has resolved. Amount and/or Complexity of Data Reviewed Clinical lab tests: ordered and reviewed Tests in the radiology section of CPT®: ordered and reviewed Tests in the medicine section of CPT®: reviewed and ordered ED Course: Progress Notes, Reevaluation, and Consults: 
[id number] on reevaluation patient remains well has no complaints. Patient counseled on hypertension and to follow-up with his primary care physician. I will start patient on his missed medications so he can get a refill for now until he can see his primary care physician. Diagnosis Clinical Impression: 1. Acute nonintractable headache, unspecified headache type 2. Essential hypertension 3. Postural dizziness with presyncope Disposition:   
 
Follow-up Information Follow up With Specialties Details Why Contact Info Jayshree Gilmore MD Family Practice Schedule an appointment as soon as possible for a visit in 2 days  [address] [place] [address] [address] [facility] [address] [address] 
[phone] [facility] EMERGENCY DEPT Emergency Medicine  As needed, If symptoms worsen [address] [id number] 
[phone] Patient's Medications Start Taking AMLODIPINE (NORVASC) 2.5 MG TABLET    Take 1 Tab by mouth daily. METOPROLOL TARTRATE (LOPRESSOR) 25 MG TABLET    Take 1 Tab by mouth two (2) times a day. Continue Taking ASPIRIN 81 MG CHEWABLE TABLET    Take 81 mg by mouth daily. FLUTICASONE (FLONASE) 50 MCG/ACTUATION NASAL SPRAY    2 Sprays by Both Nostrils route daily. LORATADINE (CLARITIN) 10 MG TABLET    Take 1 Tab by mouth daily as needed for Allergies or Rhinitis. LOSARTAN-HYDROCHLOROTHIAZIDE (HYZAAR) 100-25 MG PER TABLET    Take 1 tablet by mouth daily. METOPROLOL SUCCINATE (TOPROL-XL) 100 MG XL TABLET    Take 1 Tab by mouth daily. OMEPRAZOLE (PRILOSEC) 40 MG CAPSULE    Take 1 Cap by mouth daily. These Medications have changed No medications on file Stop Taking AMLODIPINE (NORVASC) 2.5 MG TABLET    Take 1 Tab by mouth daily. CYCLOBENZAPRINE (FLEXERIL) 10 MG TABLET    Take 1 Tab by mouth three (3) times daily as needed for Muscle Spasm(s). HYDROCODONE-ACETAMINOPHEN (NORCO) 5-325 MG PER TABLET    Take 1 Tab by mouth every four (4) hours as needed for Pain. Max Daily Amount: 6 Tabs. TRAMADOL (ULTRAM) 50 MG TABLET    Take 1 Tab by mouth every six (6) hours as needed for Pain. Max Daily Amount: 200 mg.  
 
_______________________________

## 2019-03-26 NOTE — ED NOTES
Patient noted to have an elevated blood pressure he is on blood pressure medication but he has run out of one for the last month and a half. Belen given teaching on dangers of untreated hypertension

## 2019-03-26 NOTE — DISCHARGE INSTRUCTIONS
Patient Education        Learning About Diuretics for High Blood Pressure  Introduction  Diuretics help to lower blood pressure. This reduces your risk of a heart attack and stroke. It also reduces your risk of kidney disease. Diuretics cause your kidneys to remove sodium and water. They also relax the blood vessel walls. These help lower your blood pressure. Examples  · Chlorthalidone  · Hydrochlorothiazide  Possible side effects  There are some common side effects. They are:  · Too little potassium. · Feeling dizzy. · Rash. · Urinating a lot. · High blood sugar. (But this is not common.)  You may have other side effects. Check the information that comes with your medicine. What to know about taking this medicine  · You may take other medicines for blood pressure. Diuretics can help those work better. They can also prevent extra fluid in your body. · You may need to take potassium pills. Or you may have to watch how much potassium is in your food. Ask your doctor about this. · You may need blood tests to check your kidneys and your potassium level. · Take your medicines exactly as prescribed. Call your doctor if you think you are having a problem with your medicine. · Check with your doctor or pharmacist before you use any other medicines. This includes over-the-counter medicines. Make sure your doctor knows all of the medicines, vitamins, herbal products, and supplements you take. Taking some medicines together can cause problems. Where can you learn more? Go to http://maggie-makenzie.info/. Enter E811 in the search box to learn more about \"Learning About Diuretics for High Blood Pressure. \"  Current as of: July 22, 2018  Content Version: 11.9  © 7732-6739 [employer]. Care instructions adapted under license by [employer] (which disclaims liability or warranty for this information).  If you have questions about a medical condition or this instruction, always ask your healthcare professional. [place] [address] any warranty or liability for your use of this information.

## 2019-03-26 NOTE — ED NOTES
Bedside, Verbal and Written shift change report given to [facility] (oncoming nurse) by Daniel FRANCIS(offgoing nurse). Report included the following information SBAR, Kardex, and MAR. Hourly rounding and  chart checks completed.

## 2019-03-26 NOTE — ED NOTES
Patient brought back to room IV inserted 20 to left AC blood drawn and sent placed on cardiac monitor

## 2019-03-26 NOTE — ED TRIAGE NOTES
Patient reports that he was on his way to work and felt dizzy, he has a history TIA. Patient's blood pressure is 209/118.

## 2019-09-10 ENCOUNTER — HOSPITAL ENCOUNTER (EMERGENCY)
Age: 54
Discharge: HOME | End: 2019-09-10
Attending: EMERGENCY MEDICINE
Payer: COMMERCIAL

## 2019-09-10 VITALS
HEIGHT: 70 IN | DIASTOLIC BLOOD PRESSURE: 111 MMHG | RESPIRATION RATE: 16 BRPM | HEART RATE: 65 BPM | OXYGEN SATURATION: 99 % | SYSTOLIC BLOOD PRESSURE: 196 MMHG | BODY MASS INDEX: 34.44 KG/M2 | TEMPERATURE: 97.7 F

## 2019-09-10 DIAGNOSIS — S39.012A BACK STRAIN, INITIAL ENCOUNTER: Primary | ICD-10-CM

## 2019-09-10 DIAGNOSIS — R03.0 ELEVATED BLOOD PRESSURE READING: ICD-10-CM

## 2019-09-10 PROCEDURE — 74011000250 HC RX REV CODE- 250: Performed by: PHYSICIAN ASSISTANT

## 2019-09-10 PROCEDURE — 99284 EMERGENCY DEPT VISIT MOD MDM: CPT

## 2019-09-10 PROCEDURE — 74011250637 HC RX REV CODE- 250/637: Performed by: PHYSICIAN ASSISTANT

## 2019-09-10 RX ORDER — NAPROXEN 250 MG/1
500 TABLET ORAL
Status: COMPLETED | OUTPATIENT
Start: 2019-09-10 | End: 2019-09-10

## 2019-09-10 RX ORDER — AMLODIPINE BESYLATE 5 MG/1
2.5 TABLET ORAL
Status: COMPLETED | OUTPATIENT
Start: 2019-09-10 | End: 2019-09-10

## 2019-09-10 RX ORDER — METOPROLOL TARTRATE 25 MG/1
25 TABLET, FILM COATED ORAL 2 TIMES DAILY
Qty: 30 TAB | Refills: 0 | Status: SHIPPED | OUTPATIENT
Start: 2019-09-10

## 2019-09-10 RX ORDER — LIDOCAINE 50 MG/G
PATCH TOPICAL
Qty: 1 PACKAGE | Refills: 0 | Status: SHIPPED | OUTPATIENT
Start: 2019-09-10 | End: 2020-03-10

## 2019-09-10 RX ORDER — METOPROLOL TARTRATE 25 MG/1
25 TABLET, FILM COATED ORAL
Status: COMPLETED | OUTPATIENT
Start: 2019-09-10 | End: 2019-09-10

## 2019-09-10 RX ORDER — CYCLOBENZAPRINE HCL 10 MG
10 TABLET ORAL
Status: COMPLETED | OUTPATIENT
Start: 2019-09-10 | End: 2019-09-10

## 2019-09-10 RX ORDER — LIDOCAINE 4 G/100G
1 PATCH TOPICAL
Status: DISCONTINUED | OUTPATIENT
Start: 2019-09-10 | End: 2019-09-10 | Stop reason: HOSPADM

## 2019-09-10 RX ORDER — NAPROXEN 500 MG/1
500 TABLET ORAL 2 TIMES DAILY WITH MEALS
Qty: 20 TAB | Refills: 0 | Status: SHIPPED | OUTPATIENT
Start: 2019-09-10 | End: 2019-09-20

## 2019-09-10 RX ORDER — AMLODIPINE BESYLATE 2.5 MG/1
5 TABLET ORAL DAILY
Qty: 30 TAB | Refills: 0 | Status: SHIPPED | OUTPATIENT
Start: 2019-09-10

## 2019-09-10 RX ORDER — CYCLOBENZAPRINE HCL 5 MG
10 TABLET ORAL 3 TIMES DAILY
Qty: 12 TAB | Refills: 0 | Status: SHIPPED | OUTPATIENT
Start: 2019-09-10 | End: 2020-03-10

## 2019-09-10 RX ADMIN — METOPROLOL TARTRATE 25 MG: 25 TABLET ORAL at 07:59

## 2019-09-10 RX ADMIN — CYCLOBENZAPRINE HYDROCHLORIDE 10 MG: 10 TABLET, FILM COATED ORAL at 07:24

## 2019-09-10 RX ADMIN — NAPROXEN 500 MG: 250 TABLET ORAL at 07:24

## 2019-09-10 RX ADMIN — AMLODIPINE BESYLATE 2.5 MG: 5 TABLET ORAL at 07:59

## 2019-09-10 NOTE — ED TRIAGE NOTES
Patient complaining of lower back pain. Denies incontinence of either bowel or bladder.    Pt states it hurts on the left,  But if he flexes his neck to the right he feels it down the right side

## 2019-09-10 NOTE — DISCHARGE INSTRUCTIONS
Patient Education      Take medication as prescribed. Follow-up with the orthopedic physician within 2 days for reassessment. Bring the results from this visit with you for their review. Return to the ED immediately for any new, worsening, or persistent symptoms, including fever, vomiting, abdominal pain, weakness, or any other medical concerns. Back Care and Preventing Injuries: Care Instructions  Your Care Instructions    You can hurt your back doing many everyday activities: lifting a heavy box, bending down to garden, exercising at the gym, and even getting out of bed. But you can keep your back strong and healthy by doing some exercises. You also can follow a few tips for sitting, sleeping, and lifting to avoid hurting your back again. Talk to your doctor before you start an exercise program. Ask for help if you want to learn more about keeping your back healthy. Follow-up care is a key part of your treatment and safety. Be sure to make and go to all appointments, and call your doctor if you are having problems. It's also a good idea to know your test results and keep a list of the medicines you take. How can you care for yourself at home? · Stay at a healthy weight to avoid strain on your lower back. · Do not smoke. Smoking increases the risk of osteoporosis, which weakens the spine. If you need help quitting, talk to your doctor about stop-smoking programs and medicines. These can increase your chances of quitting for good. · Make sure you sleep in a position that maintains your back's normal curves and on a mattress that feels comfortable. Sleep on your side with a pillow between your knees, or sleep on your back with a pillow under your knees. These positions can reduce strain on your back. · When you get out of bed, lie on your side and bend both knees. Drop your feet over the edge of the bed as you push up with both arms. Scoot to the edge of the bed.  Make sure your feet are in line with your rear end (buttocks), and then stand up. · If you must stand for a long time, put one foot on a stool, ledge, or box. Exercise to strengthen your back and other muscles  · Get at least 30 minutes of exercise on most days of the week. Walking is a good choice. You also may want to do other activities, such as running, swimming, cycling, or playing tennis or team sports. · Stretch your back muscles. Here are few exercises to try:  ? Lie on your back with your knees bent and your feet flat on the floor. Gently pull one bent knee to your chest. Put that foot back on the floor, and then pull the other knee to your chest. Hold for 15 to 30 seconds. Repeat 2 to 4 times. ? Do pelvic tilts. Lie on your back with your knees bent. Tighten your stomach muscles. Pull your belly button (navel) in and up toward your ribs. You should feel like your back is pressing to the floor and your hips and pelvis are slightly lifting off the floor. Hold for 6 seconds while breathing smoothly. · Keep your core muscles strong. The muscles of your back, belly (abdomen), and buttocks support your spine. ? Pull in your belly, and imagine pulling your navel toward your spine. Hold this for 6 seconds, then relax. Remember to keep breathing normally as you tense your muscles. ? Do curl-ups. Always do them with your knees bent. Keep your low back on the floor, and curl your shoulders toward your knees using a smooth, slow motion. Keep your arms folded across your chest. If this bothers your neck, try putting your hands behind your neck (not your head), with your elbows spread apart. ? Lie on your back with your knees bent and your feet flat on the floor. Tighten your belly muscles, and then push with your feet and raise your buttocks up a few inches. Hold this position 6 seconds as you continue to breathe normally, then lower yourself slowly to the floor. Repeat 8 to 12 times. ? If you like group exercise, try Pilates or yoga.  These classes have poses that strengthen the core muscles. Protect your back when you sit  · Place a small pillow, a rolled-up towel, or a lumbar roll in the curve of your back if you need extra support. · Sit in a chair that is low enough to let you place both feet flat on the floor with both knees nearly level with your hips. If your chair or desk is too high, use a foot rest to raise your knees. · When driving, keep your knees nearly level with your hips. Sit straight, and drive with both hands on the steering wheel. Your arms should be in a slightly bent position. · Try a kneeling chair, which helps tilt your hips forward. This takes pressure off your lower back. · Try sitting on an exercise ball. It can rock from side to side, which helps keep your back loose. Lift properly  · Squat down, bending at the hips and knees only. If you need to, put one knee to the floor and extend your other knee in front of you, bent at a right angle (half kneeling). · Press your chest straight forward. This helps keep your upper back straight while keeping a slight arch in your low back. · Hold the load as close to your body as possible, at the level of your navel. · Use your feet to change direction, taking small steps. · Lead with your hips as you change direction. Keep your shoulders in line with your hips as you move. Do not twist your body. · Set down your load carefully, squatting with your knees and hips only. When should you call for help? Watch closely for changes in your health, and be sure to contact your doctor if you have any problems. Where can you learn more? Go to http://maggie-makenzie.info/. Enter S810 in the search box to learn more about \"Back Care and Preventing Injuries: Care Instructions. \"  Current as of: September 20, 2018  Content Version: 12.1  © 8991-9312 Healthwise, [employer].  Care instructions adapted under license by [employer] (which disclaims liability or warranty for this information). If you have questions about a medical condition or this instruction, always ask your healthcare professional. [address] any warranty or liability for your use of this information. Patient Education        Elevated Blood Pressure: Care Instructions  Your Care Instructions    Blood pressure is a measure of how hard the blood pushes against the walls of your arteries. It's normal for blood pressure to go up and down throughout the day. But if it stays up over time, you have high blood pressure. Two numbers tell you your blood pressure. The first number is the systolic pressure. It shows how hard the blood pushes when your heart is pumping. The second number is the diastolic pressure. It shows how hard the blood pushes between heartbeats, when your heart is relaxed and filling with blood. An ideal blood pressure in adults is less than 120/80 (say \"120 over 80\"). High blood pressure is 140/90 or higher. You have high blood pressure if your top number is 140 or higher or your bottom number is 90 or higher, or both. The main test for high blood pressure is simple, fast, and painless. To diagnose high blood pressure, your doctor will test your blood pressure at different times. After testing your blood pressure, your doctor may ask you to test it again when you are home. If you are diagnosed with high blood pressure, you can work with your doctor to make a long-term plan to manage it. Follow-up care is a key part of your treatment and safety. Be sure to make and go to all appointments, and call your doctor if you are having problems. It's also a good idea to know your test results and keep a list of the medicines you take. How can you care for yourself at home? · Do not smoke. Smoking increases your risk for heart attack and stroke. If you need help quitting, talk to your doctor about stop-smoking programs and medicines.  These can increase your chances of quitting for good. · Stay at a healthy weight. · Try to limit how much sodium you eat to less than 2,300 milligrams (mg) a day. Your doctor may ask you to try to eat less than 1,500 mg a day. · Be physically active. Get at least 30 minutes of exercise on most days of the week. Walking is a good choice. You also may want to do other activities, such as running, swimming, cycling, or playing tennis or team sports. · Avoid or limit alcohol. Talk to your doctor about whether you can drink any alcohol. · Eat plenty of fruits, vegetables, and low-fat dairy products. Eat less saturated and total fats. · Learn how to check your blood pressure at home. When should you call for help? Call your doctor now or seek immediate medical care if:  ? · Your blood pressure is much higher than normal (such as 180/110 or higher). ? · You think high blood pressure is causing symptoms such as:  ¨ Severe headache. ¨ Blurry vision. ? Watch closely for changes in your health, and be sure to contact your doctor if:  ? · You do not get better as expected. Where can you learn more? Go to http://maggie-makenzie.info/. Enter G281 in the search box to learn more about \"Elevated Blood Pressure: Care Instructions. \"  Current as of: September 21, 2016  Content Version: 11.4  © 2917-2225 [employer]. Care instructions adapted under license by [employer] (which disclaims liability or warranty for this information). If you have questions about a medical condition or this instruction, always ask your healthcare professional. [place] [address] any warranty or liability for your use of this information. Patient Education        Back Strain: Care Instructions  Overview    A back strain happens when you overstretch, or pull, a muscle in your back. You may hurt your back in an accident or when you exercise or lift something. Sometimes you may not know how you hurt your back.   Most back pain will get better with rest and time. You can take care of yourself at home to help your back heal.  Follow-up care is a key part of your treatment and safety. Be sure to make and go to all appointments, and call your doctor if you are having problems. It's also a good idea to know your test results and keep a list of the medicines you take. How can you care for yourself at home? · Try to stay as active as you can, but stop or reduce any activity that causes pain. · Put ice or a cold pack on the sore muscle for 10 to 20 minutes at a time to stop swelling. Try this every 1 to 2 hours for 3 days (when you are awake) or until the swelling goes down. Put a thin cloth between the ice pack and your skin. · After 2 or 3 days, apply a heating pad on low or a warm cloth to your back. Some doctors suggest that you go back and forth between hot and cold treatments. · Take pain medicines exactly as directed. ? If the doctor gave you a prescription medicine for pain, take it as prescribed. ? If you are not taking a prescription pain medicine, ask your doctor if you can take an over-the-counter medicine. · Try sleeping on your side with a pillow between your legs. Or put a pillow under your knees when you lie on your back. These measures can ease pain in your lower back. · Return to your usual level of activity slowly. When should you call for help? Call 911 anytime you think you may need emergency care. For example, call if:    · You are unable to move a leg at all.   [facility] your doctor now or seek immediate medical care if:    · You have new or worse symptoms in your legs, belly, or buttocks. Symptoms may include:  ? Numbness or tingling. ? Weakness. ? Pain.     · You lose bladder or bowel control.    Watch closely for changes in your health, and be sure to contact your doctor if:    · You have a fever, lose weight, or don't feel well.     · You are not getting better as expected.    Where can you learn more?  Go to http://maggie-makenzie.info/. Enter P910 in the search box to learn more about \"Back Strain: Care Instructions. \"  Current as of: September 20, 2018  Content Version: 12.1  © 9941-3727 Healthwise, [employer]. Care instructions adapted under license by [employer] (which disclaims liability or warranty for this information). If you have questions about a medical condition or this instruction, always ask your healthcare professional. [address] any warranty or liability for your use of this information.

## 2019-09-10 NOTE — LETTER
[address] [place] [address] [address] [facility] EMERGENCY DEPT 
[address] [facility] [id number] [phone] Work/School Note Date: 9/10/2019 To Whom It May concern: 
 
Gus Velazquez was seen and treated today in the emergency room by the following provider(s): 
Attending Provider: Luis Hoover MD 
Physician Assistant: ANTONY Maza. Gus Velazquez may return to work on 9/13/19.  
 
Sincerely, 
 
 
 
 
ANTONY Shah

## 2019-09-10 NOTE — ED PROVIDER NOTES
EMERGENCY DEPARTMENT HISTORY AND PHYSICAL EXAM    6:33 AM      Date: 9/10/2019  Patient Name: Seun Marquis    History of Presenting Illness     No chief complaint on file. History Provided By: Patient  Additional History (Context): Seun Marquis is a 49 y.o. male with hx of HTN, HLD, GERD, chronic back pain who presents with c/o diffuse low back pain x 1.5 weeks. Notes he has tried Motrin for symptoms without relief. Pt notes the pain is worse with any movement. Notes associated muscle spasms and tightness. Notes he lifts a great deal at work and is on ladders daily. Denies fever/chills, abdominal pain, leg pain, numbness/tingling, weakness. Denies injury/trauma. Denies urinary/bowel incontinence/retention, hx of IV drug abuse, history of cancer, saddle anesthesia. Pt notes he has not been compliant with his blood pressure medication. May take it every other day. Denies dizziness, headache, chest pain, dyspnea, numbness/tingling, weakness. PCP: Iqra Walters MD    Current Facility-Administered Medications   Medication Dose Route Frequency Provider Last Rate Last Dose    lidocaine 4 % patch 1 Patch  1 Patch TransDERmal NOW Hakeem Basilio PA   1 Patch at 09/10/19 4344     Current Outpatient Medications   Medication Sig Dispense Refill    naproxen (NAPROSYN) 500 mg tablet Take 1 Tab by mouth two (2) times daily (with meals) for 10 days. 20 Tab 0    cyclobenzaprine (FLEXERIL) 5 mg tablet Take 2 Tabs by mouth three (3) times daily. 12 Tab 0    lidocaine (LIDODERM) 5 % Apply patch to the affected area for 12 hours a day and remove for 12 hours a day. 1 Package 0    amLODIPine (NORVASC) 2.5 mg tablet Take 1 Tab by mouth daily. 30 Tab 0    metoprolol tartrate (LOPRESSOR) 25 mg tablet Take 1 Tab by mouth two (2) times a day. 30 Tab 0    metoprolol succinate (TOPROL-XL) 100 mg XL tablet Take 1 Tab by mouth daily.  30 Tab 3    losartan-hydrochlorothiazide (HYZAAR) 100-25 mg per tablet Take 1 tablet by mouth daily. 30 tablet 5    aspirin 81 mg chewable tablet Take 81 mg by mouth daily. Past History     Past Medical History:  Past Medical History:   Diagnosis Date    Chest pain     GERD (gastroesophageal reflux disease)     HLD (hyperlipidemia)     HTN (hypertension) 6/30/2011       Past Surgical History:  History reviewed. No pertinent surgical history. Family History:  Family History   Problem Relation Age of Onset   Olga Santiago Cancer Mother     Hypertension Maternal Grandmother     Cancer Maternal Grandmother     Hypertension Sister     Hypertension Brother     Cancer Maternal Grandfather        Social History:  Social History     Tobacco Use    Smoking status: Current Some Day Smoker     Packs/day: 0.25     Last attempt to quit: 5/1/2016     Years since quitting: 3.3    Smokeless tobacco: Never Used    Tobacco comment: 2 - 4 black and milds   Substance Use Topics    Alcohol use: Yes     Alcohol/week: 4.2 standard drinks     Types: 5 Cans of beer per week    Drug use: No       Allergies: Allergies   Allergen Reactions    Pcn [Penicillins] Anaphylaxis    Citrus And Derivatives Other (comments)     Patient complained of swelling of throat and went to ED    Iodine Other (comments)    Other Medication Other (comments)     MOLD         Review of Systems       Review of Systems   Constitutional: Negative for chills and fever. Respiratory: Negative for shortness of breath. Cardiovascular: Negative for chest pain. Gastrointestinal: Negative for abdominal pain, nausea and vomiting. Musculoskeletal: Positive for back pain. Skin: Negative for rash. Neurological: Negative for weakness. All other systems reviewed and are negative.         Physical Exam     Visit Vitals  BP (!) 196/111 Comment: PA notified   Pulse 65   Temp 97.7 °F (36.5 °C)   Resp 16   Ht 5' 10\" (1.778 m)   SpO2 99%   BMI 34.44 kg/m²         Physical Exam   Constitutional: He appears well-developed and well-nourished. No distress. HENT:   Head: Normocephalic and atraumatic. Neck: Normal range of motion. Neck supple. Cardiovascular: Normal rate, regular rhythm, normal heart sounds and intact distal pulses. Exam reveals no gallop and no friction rub. No murmur heard. Pulmonary/Chest: Effort normal and breath sounds normal. No respiratory distress. He has no wheezes. He has no rales. Abdominal: Soft. Bowel sounds are normal. He exhibits no distension. There is no tenderness. There is no rebound and no guarding. No CVA tenderness   Musculoskeletal:        Right shoulder: He exhibits tenderness (left lumbar paravertebrals TTP without erythema/edema). He exhibits normal range of motion, no bony tenderness and no swelling. Lumbar back: He exhibits no swelling, no edema, no deformity and no spasm. No saddle anesthesia, negative SLR b/l, full ROM of hip and knee, normal gait    Neurological: He is alert. Skin: Skin is warm and dry. No rash noted. He is not diaphoretic. Nursing note and vitals reviewed. Diagnostic Study Results     Labs -  No results found for this or any previous visit (from the past 12 hour(s)). Radiologic Studies -   No orders to display         Medical Decision Making   I am the first provider for this patient. I reviewed the vital signs, available nursing notes, past medical history, past surgical history, family history and social history. Vital Signs-Reviewed the patient's vital signs. Records Reviewed: Nursing Notes and Old Medical Records (Time of Review: 6:33 AM)    ED Course: Progress Notes, Reevaluation, and Consults:  6:33 AM  Reviewed plans with patient and family member. Discussed need for close outpatient follow-up this week for reassessment and repeat blood pressure check. Discussed importance of compliance with blood pressure medication.  Discussed strict return precautions, including fever, vomiting, weakness, or any other medical concerns. One or more blood pressure readings were noted elevated during the patient's presentation in the emergency department today. This abnormal reading has been cited in the patients' diagnosis, and they have been encouraged to follow up with their primary care physician, or referred to a consultant for further evaluation and treatment. Provider Notes (Medical Decision Making): 52-year-old male with hx of chronic back pain who presents due to low back pain for 1.5 weeks. Denies numbness/tingling, weakness, midline tenderness. No red flag symptoms. Pain reproducible with palpation and worse with movement. Signs and symptoms consistent with muscular strain. Stable for discharge with symptomatic management and close outpatient follow-up for reassessment      Diagnosis     Clinical Impression:   1. Back strain, initial encounter    2. Elevated blood pressure reading        Disposition: home     Follow-up Information     Follow up With Specialties Details Why [address] [facility]    [address] [facility] EMERGENCY DEPT Emergency Medicine  If symptoms worsen [address] [facility] Orthopedic Surgery Schedule an appointment as soon as possible for a visit  [address] [facility], [address] [facility] [address].    Iqra Walters MD Family Practice In 2 days for blood pressure check [address] [place] [address]  [address] [facility]             Patient's Medications   Start Taking    CYCLOBENZAPRINE (FLEXERIL) 5 MG TABLET    Take 2 Tabs by mouth three (3) times daily. LIDOCAINE (LIDODERM) 5 %    Apply patch to the affected area for 12 hours a day and remove for 12 hours a day. NAPROXEN (NAPROSYN) 500 MG TABLET    Take 1 Tab by mouth two (2) times daily (with meals) for 10 days. Continue Taking    AMLODIPINE (NORVASC) 2.5 MG TABLET    Take 1 Tab by mouth daily.     ASPIRIN 81 MG CHEWABLE TABLET    Take 81 mg by mouth daily.    LOSARTAN-HYDROCHLOROTHIAZIDE (HYZAAR) 100-25 MG PER TABLET    Take 1 tablet by mouth daily. METOPROLOL SUCCINATE (TOPROL-XL) 100 MG XL TABLET    Take 1 Tab by mouth daily. METOPROLOL TARTRATE (LOPRESSOR) 25 MG TABLET    Take 1 Tab by mouth two (2) times a day. These Medications have changed    No medications on file   Stop Taking    FLUTICASONE (FLONASE) 50 MCG/ACTUATION NASAL SPRAY    2 Sprays by Both Nostrils route daily. LORATADINE (CLARITIN) 10 MG TABLET    Take 1 Tab by mouth daily as needed for Allergies or Rhinitis. OMEPRAZOLE (PRILOSEC) 40 MG CAPSULE    Take 1 Cap by mouth daily. Dictation disclaimer:  Please note that this dictation was completed with Knowta, the [employer] voice recognition software. Quite often unanticipated grammatical, syntax, homophones, and other interpretive errors are inadvertently transcribed by the computer software. Please disregard these errors. Please excuse any errors that have escaped final proofreading.

## 2020-03-10 ENCOUNTER — HOSPITAL ENCOUNTER (EMERGENCY)
Age: 55
Discharge: HOME | End: 2020-03-10
Attending: EMERGENCY MEDICINE
Payer: COMMERCIAL

## 2020-03-10 VITALS
TEMPERATURE: 99 F | SYSTOLIC BLOOD PRESSURE: 136 MMHG | RESPIRATION RATE: 17 BRPM | OXYGEN SATURATION: 99 % | DIASTOLIC BLOOD PRESSURE: 87 MMHG | WEIGHT: 231.7 LBS | HEART RATE: 68 BPM | BODY MASS INDEX: 33.25 KG/M2

## 2020-03-10 DIAGNOSIS — M43.6 TORTICOLLIS: Primary | ICD-10-CM

## 2020-03-10 PROCEDURE — 74011250636 HC RX REV CODE- 250/636: Performed by: EMERGENCY MEDICINE

## 2020-03-10 PROCEDURE — 74011250637 HC RX REV CODE- 250/637: Performed by: EMERGENCY MEDICINE

## 2020-03-10 PROCEDURE — 99283 EMERGENCY DEPT VISIT LOW MDM: CPT

## 2020-03-10 PROCEDURE — 96372 THER/PROPH/DIAG INJ SC/IM: CPT

## 2020-03-10 RX ORDER — OXYCODONE AND ACETAMINOPHEN 5; 325 MG/1; MG/1
1 TABLET ORAL
Status: COMPLETED | OUTPATIENT
Start: 2020-03-10 | End: 2020-03-10

## 2020-03-10 RX ORDER — IBUPROFEN 800 MG/1
800 TABLET ORAL EVERY 8 HOURS
Qty: 15 TAB | Refills: 0 | Status: SHIPPED | OUTPATIENT
Start: 2020-03-10 | End: 2020-03-15

## 2020-03-10 RX ORDER — OXYCODONE AND ACETAMINOPHEN 5; 325 MG/1; MG/1
1 TABLET ORAL
Qty: 8 TAB | Refills: 0 | Status: SHIPPED | OUTPATIENT
Start: 2020-03-10 | End: 2020-03-12

## 2020-03-10 RX ORDER — KETOROLAC TROMETHAMINE 15 MG/ML
15 INJECTION, SOLUTION INTRAMUSCULAR; INTRAVENOUS
Status: COMPLETED | OUTPATIENT
Start: 2020-03-10 | End: 2020-03-10

## 2020-03-10 RX ADMIN — KETOROLAC TROMETHAMINE 15 MG: 15 INJECTION, SOLUTION INTRAMUSCULAR; INTRAVENOUS at 07:53

## 2020-03-10 RX ADMIN — OXYCODONE HYDROCHLORIDE AND ACETAMINOPHEN 1 TABLET: 5; 325 TABLET ORAL at 07:53

## 2020-03-10 NOTE — ED TRIAGE NOTES
States it feels like someone is pulling the right side of neck.   Now having tingling in both fingers

## 2020-03-10 NOTE — LETTER
NOTIFICATION RETURN TO WORK / SCHOOL 
 
3/10/2020 7:35 AM 
 
Mr. Vianca Larsen 
[address] [place] [id number] To Whom It May Concern: 
 
Vianca Larsen is currently under the care of [facility] EMERGENCY DEPT. He will return to work/school on: 3/11/20. If there are questions or concerns please have the patient contact our office.  
 
 
 
Sincerely, 
 
 
Robert Veloz PA-C

## 2020-03-10 NOTE — ED PROVIDER NOTES
EMERGENCY DEPARTMENT HISTORY AND PHYSICAL EXAM    Date: 3/10/2020  Patient Name: Loly España    History of Presenting Illness     Chief Complaint   Patient presents with    Neck Pain    Tingling         History Provided By: Patient      Additional History (Context): Loly España is a 49 y.o. male with hypertension, obesity and stroke who presents with neck pain upon awakening this morning. He feels as if it is very stiff for him to turn his head to the left. Feels when he does that that it is pulling on the right. Denies any chest pain dizziness numbness or weakness. Denies any recent trauma. PCP: UNKNOWN    Current Facility-Administered Medications   Medication Dose Route Frequency Provider Last Rate Last Dose    ketorolac (TORADOL) injection 15 mg  15 mg IntraMUSCular NOW Ghazala Layton PA        oxyCODONE-acetaminophen (PERCOCET) 5-325 mg per tablet 1 Tab  1 Tab Oral NOW Ghazala Layton PA         Current Outpatient Medications   Medication Sig Dispense Refill    ibuprofen (MOTRIN) 800 mg tablet Take 1 Tab by mouth every eight (8) hours for 5 days. 15 Tab 0    oxyCODONE-acetaminophen (PERCOCET) 5-325 mg per tablet Take 1 Tab by mouth every six (6) hours as needed for Pain for up to 2 days. Max Daily Amount: 4 Tabs. Take 1 tablet every 6 hours as needed for pain control. 8 Tab 0    losartan/hydrochlorothiazide (HYZAAR PO) Take  by mouth.  amLODIPine (NORVASC) 2.5 mg tablet Take 2 Tabs by mouth daily. 30 Tab 0    metoprolol tartrate (LOPRESSOR) 25 mg tablet Take 1 Tab by mouth two (2) times a day. 30 Tab 0    aspirin 81 mg chewable tablet Take 81 mg by mouth daily. Past History     Past Medical History:  Past Medical History:   Diagnosis Date    Chest pain     GERD (gastroesophageal reflux disease)     HLD (hyperlipidemia)     HTN (hypertension) 6/30/2011    Stroke [facility])        Past Surgical History:  History reviewed. No pertinent surgical history.     Family History:  Family History   Problem Relation Age of Onset    Cancer Mother     Hypertension Maternal Grandmother     Cancer Maternal Grandmother     Hypertension Sister     Hypertension Brother     Cancer Maternal Grandfather        Social History:  Social History     Tobacco Use    Smoking status: Current Some Day Smoker     Packs/day: 0.25     Last attempt to quit: 5/1/2016     Years since quitting: 3.8    Smokeless tobacco: Never Used    Tobacco comment: 2 - 4 black and milds   Substance Use Topics    Alcohol use: Yes     Alcohol/week: 4.2 standard drinks     Types: 5 Cans of beer per week    Drug use: No       Allergies: Allergies   Allergen Reactions    Pcn [Penicillins] Anaphylaxis    Citrus And Derivatives Other (comments)     Patient complained of swelling of throat and went to ED    Iodine Other (comments)    Other Medication Other (comments)     MOLD         Review of Systems   Review of Systems   Constitutional: Negative for fever. Respiratory: Negative for shortness of breath. Cardiovascular: Negative for chest pain. Gastrointestinal: Negative for nausea and vomiting. Musculoskeletal: Positive for neck pain and neck stiffness. Skin: Negative for rash and wound. Neurological: Negative for dizziness, syncope, weakness, light-headedness, numbness and headaches. All Other Systems Negative  Physical Exam     Vitals:    03/10/20 0625   BP: 136/87   Pulse: 68   Resp: 17   Temp: 99 °F (37.2 °C)   SpO2: 99%   Weight: 105.1 kg (231 lb 11.2 oz)     Physical Exam  Vitals signs and nursing note reviewed. Constitutional:       General: He is not in acute distress. Appearance: He is well-developed. He is not ill-appearing, toxic-appearing or diaphoretic. HENT:      Head: Normocephalic and atraumatic. Neck:      Musculoskeletal: Normal range of motion. Muscular tenderness present. Thyroid: No thyromegaly. Vascular: No carotid bruit. Trachea: No tracheal deviation.       Comments: Negative axial loading. No midline C-spine tenderness. No bilateral upper extremity weakness. There is tenderness to the left posterior SCM distribution. Radial pulses equal.  No carotid bruits auscultated. Cardiovascular:      Rate and Rhythm: Normal rate and regular rhythm. Heart sounds: Normal heart sounds. No murmur. No friction rub. No gallop. Pulmonary:      Effort: Pulmonary effort is normal. No respiratory distress. Breath sounds: Normal breath sounds. No stridor. No wheezing or rales. Chest:      Chest wall: No tenderness. Abdominal:      General: There is no distension. Palpations: Abdomen is soft. There is no mass. Tenderness: There is no abdominal tenderness. There is no guarding or rebound. Musculoskeletal: Normal range of motion. Skin:     General: Skin is warm and dry. Coloration: Skin is not pale. Neurological:      Mental Status: He is alert. Psychiatric:         Speech: Speech normal.         Behavior: Behavior normal.         Thought Content: Thought content normal.         Judgment: Judgment normal.            Diagnostic Study Results     Labs -   No results found for this or any previous visit (from the past 12 hour(s)). Radiologic Studies -   No orders to display     CT Results  (Last 48 hours)    None        CXR Results  (Last 48 hours)    None            Medical Decision Making   I am the first provider for this patient. I reviewed the vital signs, available nursing notes, past medical history, past surgical history, family history and social history. Vital Signs-Reviewed the patient's vital signs. Procedures:  Procedures    Provider Notes (Medical Decision Making): Treat as torticollis.     MED RECONCILIATION:  Current Facility-Administered Medications   Medication Dose Route Frequency    ketorolac (TORADOL) injection 15 mg  15 mg IntraMUSCular NOW    oxyCODONE-acetaminophen (PERCOCET) 5-325 mg per tablet 1 Tab  1 Tab Oral NOW Current Outpatient Medications   Medication Sig    ibuprofen (MOTRIN) 800 mg tablet Take 1 Tab by mouth every eight (8) hours for 5 days.  oxyCODONE-acetaminophen (PERCOCET) 5-325 mg per tablet Take 1 Tab by mouth every six (6) hours as needed for Pain for up to 2 days. Max Daily Amount: 4 Tabs. Take 1 tablet every 6 hours as needed for pain control.  losartan/hydrochlorothiazide (HYZAAR PO) Take  by mouth.  amLODIPine (NORVASC) 2.5 mg tablet Take 2 Tabs by mouth daily.  metoprolol tartrate (LOPRESSOR) 25 mg tablet Take 1 Tab by mouth two (2) times a day.  aspirin 81 mg chewable tablet Take 81 mg by mouth daily. Disposition:  home    DISCHARGE NOTE:   7:37 AM    Pt has been reexamined. Patient has no new complaints, changes, or physical findings. Care plan outlined and precautions discussed. Results of exam were reviewed with the patient. All medications were reviewed with the patient; will d/c home with ibuprofen, percocet. All of pt's questions and concerns were addressed. Patient was instructed and agrees to follow up with PCP, as well as to return to the ED upon further deterioration. Patient is ready to go home. Follow-up Information     Follow up With Specialties Details [address]  Schedule an appointment as soon as possible for a visit in 2 days  [phone]  [phone]    [facility] Emergency Medicine  If symptoms worsen return immediately [address]  [phone]          Current Discharge Medication List      START taking these medications    Details   ibuprofen (MOTRIN) 800 mg tablet Take 1 Tab by mouth every eight (8) hours for 5 days. Qty: 15 Tab, Refills: 0      oxyCODONE-acetaminophen (PERCOCET) 5-325 mg per tablet Take 1 Tab by mouth every six (6) hours as needed for Pain for up to 2 days. Max Daily Amount: 4 Tabs.  Take 1 tablet every 6 hours as needed for pain control. Qty: 8 Tab, Refills: 0    Associated Diagnoses: Torticollis               Clinical Impression:   1.  Torticollis

## 2020-03-10 NOTE — DISCHARGE INSTRUCTIONS
Patient Education        Torticollis: Care Instructions  Your Care Instructions  Torticollis is a severe tightness of the muscles on one side of the neck. The tight muscles can make the head turn to one side, lean to one side, or be pulled forward or backward. It is also called wryneck. Your doctor asked questions about your health and examined you. You may also have had X-rays or other tests. If your doctor thinks another medical problem is causing your tight neck muscles, you may need more tests. Torticollis usually gets better with home care. Your doctor may have you take medicine to relieve pain or relax your muscles. He or she may suggest exercise and physical therapy to help increase flexibility and relieve stress. Your doctor may also have you wear a special collar, called a cervical collar, for a day or two. The collar may help make your neck more comfortable. Follow-up care is a key part of your treatment and safety. Be sure to make and go to all appointments, and call your doctor if you are having problems. It's also a good idea to know your test results and keep a list of the medicines you take. How can you care for yourself at home? · Be safe with medicines. Read and follow all instructions on the label. ? If the doctor gave you a prescription medicine for pain, take it as prescribed. ? If you are not taking a prescription pain medicine, ask your doctor if you can take an over-the-counter medicine. · Try using a heating pad on a low or medium setting for 15 to 20 minutes every 2 or 3 hours. Try a warm shower in place of one session with the heating pad. · Try using an ice pack for 10 to 15 minutes every 2 to 3 hours. Put a thin cloth between the ice and your skin. · If your doctor recommends a cervical collar, wear it exactly as directed. When should you call for help? Call your doctor now or seek immediate medical care if:    · You have new or worse numbness in your arms, buttocks, or legs.   · You have new or worse weakness in your arms or legs.     · Your neck pain gets worse.     · You lose bladder or bowel control.    Watch closely for changes in your health, and be sure to contact your doctor if:    · You do not get better as expected. Where can you learn more? Go to http://maggie-makenzie.info/. Enter K709 in the search box to learn more about \"Torticollis: Care Instructions. \"  Current as of: June 26, 2019  Content Version: 12.2  © 6144-5384 [employer]. Care instructions adapted under license by [employer] ([employer] (which disclaims liability or warranty for this information). If you have questions about a medical condition or this instruction, always ask your healthcare professional. [address] any warranty or liability for your use of this information.

## 2023-05-30 RX ORDER — ASPIRIN 81 MG/1
81 TABLET, CHEWABLE ORAL DAILY
COMMUNITY

## 2023-05-30 RX ORDER — AMLODIPINE BESYLATE 2.5 MG/1
TABLET ORAL DAILY
COMMUNITY
Start: 2019-09-10

## 2023-09-26 ENCOUNTER — APPOINTMENT (OUTPATIENT)
Facility: HOSPITAL | Age: 58
End: 2023-09-26
Payer: COMMERCIAL

## 2023-09-26 ENCOUNTER — HOSPITAL ENCOUNTER (EMERGENCY)
Facility: HOSPITAL | Age: 58
Discharge: HOME | End: 2023-09-26
Payer: COMMERCIAL

## 2023-09-26 VITALS
HEART RATE: 62 BPM | WEIGHT: 245 LBS | DIASTOLIC BLOOD PRESSURE: 81 MMHG | BODY MASS INDEX: 35.07 KG/M2 | HEIGHT: 70 IN | RESPIRATION RATE: 16 BRPM | TEMPERATURE: 98.9 F | OXYGEN SATURATION: 99 % | SYSTOLIC BLOOD PRESSURE: 136 MMHG

## 2023-09-26 DIAGNOSIS — Z91.148 NONCOMPLIANCE WITH MEDICATIONS: ICD-10-CM

## 2023-09-26 DIAGNOSIS — I16.9 HYPERTENSIVE CRISIS: ICD-10-CM

## 2023-09-26 DIAGNOSIS — R42 DIZZINESS: Primary | ICD-10-CM

## 2023-09-26 LAB
ALBUMIN SERPL-MCNC: 3.4 G/DL (ref 3.4–5)
ALBUMIN/GLOB SERPL: 0.8 (ref 0.8–1.7)
ALP SERPL-CCNC: 76 U/L (ref 45–117)
ALT SERPL-CCNC: 45 U/L (ref 16–61)
ANION GAP SERPL CALC-SCNC: 4 MMOL/L (ref 3–18)
AST SERPL-CCNC: 47 U/L (ref 10–38)
BASOPHILS # BLD: 0.1 K/UL (ref 0–0.1)
BASOPHILS NFR BLD: 1 % (ref 0–2)
BILIRUB SERPL-MCNC: 0.6 MG/DL (ref 0.2–1)
BUN SERPL-MCNC: 12 MG/DL (ref 7–18)
BUN/CREAT SERPL: 11 (ref 12–20)
CALCIUM SERPL-MCNC: 9 MG/DL (ref 8.5–10.1)
CHLORIDE SERPL-SCNC: 107 MMOL/L (ref 100–111)
CO2 SERPL-SCNC: 25 MMOL/L (ref 21–32)
CREAT SERPL-MCNC: 1.11 MG/DL (ref 0.6–1.3)
DIFFERENTIAL METHOD BLD: ABNORMAL
EKG ATRIAL RATE: 66 BPM
EKG DIAGNOSIS: NORMAL
EKG P AXIS: 63 DEGREES
EKG P-R INTERVAL: 166 MS
EKG Q-T INTERVAL: 410 MS
EKG QRS DURATION: 84 MS
EKG QTC CALCULATION (BAZETT): 429 MS
EKG R AXIS: -13 DEGREES
EKG T AXIS: 111 DEGREES
EKG VENTRICULAR RATE: 66 BPM
EOSINOPHIL # BLD: 0.1 K/UL (ref 0–0.4)
EOSINOPHIL NFR BLD: 3 % (ref 0–5)
ERYTHROCYTE [DISTWIDTH] IN BLOOD BY AUTOMATED COUNT: 13 % (ref 11.6–14.5)
GLOBULIN SER CALC-MCNC: 4.2 G/DL (ref 2–4)
GLUCOSE SERPL-MCNC: 83 MG/DL (ref 74–99)
HCT VFR BLD AUTO: 50.9 % (ref 36–48)
HGB BLD-MCNC: 17 G/DL (ref 13–16)
IMM GRANULOCYTES # BLD AUTO: 0 K/UL (ref 0–0.04)
IMM GRANULOCYTES NFR BLD AUTO: 0 % (ref 0–0.5)
LYMPHOCYTES # BLD: 1.3 K/UL (ref 0.9–3.6)
LYMPHOCYTES NFR BLD: 28 % (ref 21–52)
MAGNESIUM SERPL-MCNC: 2.5 MG/DL (ref 1.6–2.6)
MCH RBC QN AUTO: 30.2 PG (ref 24–34)
MCHC RBC AUTO-ENTMCNC: 33.4 G/DL (ref 31–37)
MCV RBC AUTO: 90.6 FL (ref 78–100)
MONOCYTES # BLD: 0.4 K/UL (ref 0.05–1.2)
MONOCYTES NFR BLD: 9 % (ref 3–10)
NEUTS SEG # BLD: 2.8 K/UL (ref 1.8–8)
NEUTS SEG NFR BLD: 58 % (ref 40–73)
NRBC # BLD: 0 K/UL (ref 0–0.01)
NRBC BLD-RTO: 0 PER 100 WBC
PLATELET # BLD AUTO: 115 K/UL (ref 135–420)
PMV BLD AUTO: 12.7 FL (ref 9.2–11.8)
POTASSIUM SERPL-SCNC: 5.1 MMOL/L (ref 3.5–5.5)
PROT SERPL-MCNC: 7.6 G/DL (ref 6.4–8.2)
RBC # BLD AUTO: 5.62 M/UL (ref 4.35–5.65)
SODIUM SERPL-SCNC: 136 MMOL/L (ref 136–145)
TROPONIN I SERPL HS-MCNC: 35 NG/L (ref 0–78)
TROPONIN I SERPL HS-MCNC: 37 NG/L (ref 0–78)
WBC # BLD AUTO: 4.8 K/UL (ref 4.6–13.2)

## 2023-09-26 PROCEDURE — 96374 THER/PROPH/DIAG INJ IV PUSH: CPT

## 2023-09-26 PROCEDURE — 6370000000 HC RX 637 (ALT 250 FOR IP): Performed by: NURSE PRACTITIONER

## 2023-09-26 PROCEDURE — 93010 ELECTROCARDIOGRAM REPORT: CPT | Performed by: INTERNAL MEDICINE

## 2023-09-26 PROCEDURE — 71046 X-RAY EXAM CHEST 2 VIEWS: CPT

## 2023-09-26 PROCEDURE — 2580000003 HC RX 258: Performed by: NURSE PRACTITIONER

## 2023-09-26 PROCEDURE — 96361 HYDRATE IV INFUSION ADD-ON: CPT

## 2023-09-26 PROCEDURE — 93005 ELECTROCARDIOGRAM TRACING: CPT | Performed by: NURSE PRACTITIONER

## 2023-09-26 PROCEDURE — 96376 TX/PRO/DX INJ SAME DRUG ADON: CPT

## 2023-09-26 PROCEDURE — 84484 ASSAY OF TROPONIN QUANT: CPT

## 2023-09-26 PROCEDURE — 99285 EMERGENCY DEPT VISIT HI MDM: CPT

## 2023-09-26 PROCEDURE — 6360000002 HC RX W HCPCS: Performed by: NURSE PRACTITIONER

## 2023-09-26 PROCEDURE — 83735 ASSAY OF MAGNESIUM: CPT

## 2023-09-26 PROCEDURE — 80053 COMPREHEN METABOLIC PANEL: CPT

## 2023-09-26 PROCEDURE — 85025 COMPLETE CBC W/AUTO DIFF WBC: CPT

## 2023-09-26 RX ORDER — METOPROLOL SUCCINATE 25 MG/1
25 TABLET, EXTENDED RELEASE ORAL
Status: COMPLETED | OUTPATIENT
Start: 2023-09-26 | End: 2023-09-26

## 2023-09-26 RX ORDER — AMLODIPINE BESYLATE 5 MG/1
5 TABLET ORAL DAILY
Qty: 30 TABLET | Refills: 0 | Status: SHIPPED | OUTPATIENT
Start: 2023-09-26

## 2023-09-26 RX ORDER — 0.9 % SODIUM CHLORIDE 0.9 %
1000 INTRAVENOUS SOLUTION INTRAVENOUS ONCE
Status: COMPLETED | OUTPATIENT
Start: 2023-09-26 | End: 2023-09-26

## 2023-09-26 RX ORDER — LABETALOL HYDROCHLORIDE 5 MG/ML
20 INJECTION, SOLUTION INTRAVENOUS ONCE
Status: COMPLETED | OUTPATIENT
Start: 2023-09-26 | End: 2023-09-26

## 2023-09-26 RX ORDER — AMLODIPINE BESYLATE 5 MG/1
5 TABLET ORAL DAILY
Status: DISCONTINUED | OUTPATIENT
Start: 2023-09-26 | End: 2023-09-26 | Stop reason: HOSPADM

## 2023-09-26 RX ORDER — LABETALOL HYDROCHLORIDE 5 MG/ML
10 INJECTION, SOLUTION INTRAVENOUS ONCE
Status: COMPLETED | OUTPATIENT
Start: 2023-09-26 | End: 2023-09-26

## 2023-09-26 RX ORDER — CLONIDINE HYDROCHLORIDE 0.1 MG/1
0.2 TABLET ORAL
Status: COMPLETED | OUTPATIENT
Start: 2023-09-26 | End: 2023-09-26

## 2023-09-26 RX ADMIN — LABETALOL HYDROCHLORIDE 20 MG: 5 INJECTION, SOLUTION INTRAVENOUS at 12:13

## 2023-09-26 RX ADMIN — LABETALOL HYDROCHLORIDE 10 MG: 5 INJECTION, SOLUTION INTRAVENOUS at 10:08

## 2023-09-26 RX ADMIN — CLONIDINE HYDROCHLORIDE 0.2 MG: 0.1 TABLET ORAL at 12:46

## 2023-09-26 RX ADMIN — SODIUM CHLORIDE 1000 ML: 9 INJECTION, SOLUTION INTRAVENOUS at 09:26

## 2023-09-26 RX ADMIN — AMLODIPINE BESYLATE 5 MG: 5 TABLET ORAL at 08:34

## 2023-09-26 RX ADMIN — METOPROLOL SUCCINATE 25 MG: 25 TABLET, EXTENDED RELEASE ORAL at 08:33

## 2023-09-26 ASSESSMENT — HEART SCORE: ECG: 0

## 2023-09-26 ASSESSMENT — ENCOUNTER SYMPTOMS
RESPIRATORY NEGATIVE: 1
PHOTOPHOBIA: 0

## 2023-09-26 NOTE — ED NOTES
Pt was seen and treated by provider. IV removed. Discharge papers with instructions given, verbalized understanding. Ambulated to the exit without difficulty.      Titi Mohamud, RN  09/26/23 [id number]

## 2023-09-26 NOTE — ED TRIAGE NOTES
Pt in ED with c/o dizziness. Pt states it started this AM and thinks his BP is High. Pts BP in triage 212/116. Pt did not take BP meds because he wanted to come get things checked out.  Pt needs a refill on [facility], does not currently have a PCP

## 2023-09-26 NOTE — ED PROVIDER NOTES
Signs-Reviewed the patient's vital signs. Records Reviewed: Nursing Notes and Old Medical Records Personally, on initial evaluation (Time of Review: 2:24 PM)      ED Course: Progress Notes, Reevaluation, and Consults:  60-year-old male presents to the emergency department with complaints of headache and dizziness that started today. Patient afebrile. Patient is alert and oriented does not appear to be any acute distress. Blood pressure on arrival 212/116       Will obtain lab work and imaging for further evaluation of patients complaint. Will continue to monitor and evaluate patient while in the ED. Orders as below:  Orders Placed This Encounter   Procedures    XR CHEST (2 VW)    CBC with Auto Differential    Troponin    CMP    Magnesium    Troponin    Vital Signs (Recheck)    EKG 12 Lead    Saline lock IV      1 L normal saline bolus IV given in the emergency department along with patient's oral dose of Norvasc 5 mg and Toprol 25 mg. Blood pressure still elevated 10 mg of labetalol IV to be given in the emergency department for elevated blood pressure. Blood pressure currently 237/115    Blood pressure elevated in the emergency department 20 mg IV labetalol to be given in the emergency department. Blood Pressure currently 213/105 patient is asymptomatic. No headaches or visual changes to pain. CBC shows no signs of infection or anemia   CMP shows electrolytes within normal limits   troponin at 37 repeat troponin at 35 both within normal limits     EKG shows normal sinus rhythm with no ST elevation as interpreted by me     chest x-ray is negative. Reviewed labs and blood pressures with physician Dr. Sandy Myers recommendation for Catapres 0.2 mg oral dose and recheck BP in 1 hour if has improvement discharged home. Current blood pressure is 203/102    Blood pressure a lot better now down to 136/81.   Recommend patient to follow-up PCP in 2 days for reassessment as well as 1 week for BP

## 2024-01-16 ENCOUNTER — OFFICE VISIT (OUTPATIENT)
Facility: CLINIC | Age: 59
End: 2024-01-16
Payer: COMMERCIAL

## 2024-01-16 VITALS
DIASTOLIC BLOOD PRESSURE: 119 MMHG | SYSTOLIC BLOOD PRESSURE: 230 MMHG | TEMPERATURE: 97.7 F | RESPIRATION RATE: 16 BRPM | HEART RATE: 65 BPM | BODY MASS INDEX: 30.78 KG/M2 | HEIGHT: 70 IN | WEIGHT: 215 LBS | OXYGEN SATURATION: 99 %

## 2024-01-16 DIAGNOSIS — I10 HYPERTENSION, UNSPECIFIED TYPE: Primary | ICD-10-CM

## 2024-01-16 PROCEDURE — 93000 ELECTROCARDIOGRAM COMPLETE: CPT

## 2024-01-16 PROCEDURE — 3077F SYST BP >= 140 MM HG: CPT

## 2024-01-16 PROCEDURE — 3080F DIAST BP >= 90 MM HG: CPT

## 2024-01-16 PROCEDURE — 99204 OFFICE O/P NEW MOD 45 MIN: CPT

## 2024-01-16 RX ORDER — METOPROLOL SUCCINATE 100 MG/1
100 TABLET, EXTENDED RELEASE ORAL DAILY
COMMUNITY

## 2024-01-16 RX ORDER — LOSARTAN POTASSIUM AND HYDROCHLOROTHIAZIDE 25; 100 MG/1; MG/1
TABLET ORAL
COMMUNITY

## 2024-01-16 SDOH — ECONOMIC STABILITY: HOUSING INSECURITY
IN THE LAST 12 MONTHS, WAS THERE A TIME WHEN YOU DID NOT HAVE A STEADY PLACE TO SLEEP OR SLEPT IN A SHELTER (INCLUDING NOW)?: NO

## 2024-01-16 SDOH — ECONOMIC STABILITY: FOOD INSECURITY: WITHIN THE PAST 12 MONTHS, THE FOOD YOU BOUGHT JUST DIDN'T LAST AND YOU DIDN'T HAVE MONEY TO GET MORE.: NEVER TRUE

## 2024-01-16 SDOH — ECONOMIC STABILITY: INCOME INSECURITY: HOW HARD IS IT FOR YOU TO PAY FOR THE VERY BASICS LIKE FOOD, HOUSING, MEDICAL CARE, AND HEATING?: NOT HARD AT ALL

## 2024-01-16 SDOH — ECONOMIC STABILITY: FOOD INSECURITY: WITHIN THE PAST 12 MONTHS, YOU WORRIED THAT YOUR FOOD WOULD RUN OUT BEFORE YOU GOT MONEY TO BUY MORE.: NEVER TRUE

## 2024-01-16 NOTE — PROGRESS NOTES
Ruben Muñiz is a 58 y.o. year old male who presents in office today for   Chief Complaint   Patient presents with    Establish Care       Is someone accompanying this pt? NO    Is the patient using any DME equipment during OV? NO    Depression Screening:        No data to display                Abuse Screenin/16/2024    11:00 AM   AMB Abuse Screening   Do you ever feel afraid of your partner? N   Are you in a relationship with someone who physically or mentally threatens you? N   Is it safe for you to go home? Y       Learning Assessment:  Who is the primary learner? Patient    What is the preferred language for health care of the primary learner? ENGLISH    How does the primary learner prefer to learn new concepts? DEMONSTRATION    How does the primary learner prefer to learn new concepts? OTHER (COMMENT) HANDS ON   Answered By PATIENT    Relationship to Learner SELF    Highest level of education completed by primary learner? GRADUATED HIGH SCHOOL OR GED    Are there any barriers / factors that could impact learning? NONE    Will there be a co-learner / caregiver? No        Fall Risk:       No data to display                    Coordination of Care:   1. \"Have you been to the ER, urgent care clinic since your last visit?  Hospitalized since your last visit?\" YES - BLOOD PRESSURE    2. \"Have you seen or consulted any other health care providers outside of the [facility] since your last visit?\" KALIE/CHINO    3. For patients aged 45-75: Has the patient had a colonoscopy / FIT/ Cologuard? HAD DID COLONOSCOPY YEARS AGO, WANTING TO DO THE COLOGAURD    If the patient is female:    4. For patients aged 40-74: Has the patient had a mammogram within the past 2 years? NA    5. For patients aged 21-65: Has the patient had a pap smear? NA    Health Maintenance: reviewed and discussed and ordered per Provider.    Health Maintenance Due   Topic Date Due    Hepatitis B vaccine (1 of 3 - 3-dose

## 2024-10-23 ENCOUNTER — HOSPITAL ENCOUNTER (EMERGENCY)
Facility: HOSPITAL | Age: 59
Discharge: HOME | End: 2024-10-23
Attending: STUDENT IN AN ORGANIZED HEALTH CARE EDUCATION/TRAINING PROGRAM
Payer: COMMERCIAL

## 2024-10-23 ENCOUNTER — APPOINTMENT (OUTPATIENT)
Facility: HOSPITAL | Age: 59
End: 2024-10-23
Payer: COMMERCIAL

## 2024-10-23 VITALS
HEIGHT: 70 IN | TEMPERATURE: 97.7 F | RESPIRATION RATE: 12 BRPM | DIASTOLIC BLOOD PRESSURE: 102 MMHG | BODY MASS INDEX: 30.06 KG/M2 | WEIGHT: 210 LBS | HEART RATE: 66 BPM | OXYGEN SATURATION: 98 % | SYSTOLIC BLOOD PRESSURE: 188 MMHG

## 2024-10-23 DIAGNOSIS — R00.2 PALPITATIONS: ICD-10-CM

## 2024-10-23 DIAGNOSIS — R07.9 CHEST PAIN, UNSPECIFIED TYPE: Primary | ICD-10-CM

## 2024-10-23 LAB
ALBUMIN SERPL-MCNC: 3.5 G/DL (ref 3.4–5)
ALBUMIN/GLOB SERPL: 1 (ref 0.8–1.7)
ALP SERPL-CCNC: 86 U/L (ref 45–117)
ALT SERPL-CCNC: 36 U/L (ref 16–61)
ANION GAP SERPL CALC-SCNC: 3 MMOL/L (ref 3–18)
AST SERPL-CCNC: 32 U/L (ref 10–38)
BASOPHILS # BLD: 0.1 K/UL (ref 0–0.1)
BASOPHILS NFR BLD: 1 % (ref 0–2)
BILIRUB SERPL-MCNC: 0.6 MG/DL (ref 0.2–1)
BUN SERPL-MCNC: 15 MG/DL (ref 7–18)
BUN/CREAT SERPL: 13 (ref 12–20)
CALCIUM SERPL-MCNC: 8.8 MG/DL (ref 8.5–10.1)
CHLORIDE SERPL-SCNC: 109 MMOL/L (ref 100–111)
CO2 SERPL-SCNC: 27 MMOL/L (ref 21–32)
CREAT SERPL-MCNC: 1.14 MG/DL (ref 0.6–1.3)
DIFFERENTIAL METHOD BLD: ABNORMAL
EOSINOPHIL # BLD: 0.2 K/UL (ref 0–0.4)
EOSINOPHIL NFR BLD: 3 % (ref 0–5)
ERYTHROCYTE [DISTWIDTH] IN BLOOD BY AUTOMATED COUNT: 12.3 % (ref 11.6–14.5)
GLOBULIN SER CALC-MCNC: 3.6 G/DL (ref 2–4)
GLUCOSE SERPL-MCNC: 87 MG/DL (ref 74–99)
HCT VFR BLD AUTO: 50.1 % (ref 36–48)
HGB BLD-MCNC: 16.6 G/DL (ref 13–16)
IMM GRANULOCYTES # BLD AUTO: 0 K/UL (ref 0–0.04)
IMM GRANULOCYTES NFR BLD AUTO: 0 % (ref 0–0.5)
LYMPHOCYTES # BLD: 1.2 K/UL (ref 0.9–3.6)
LYMPHOCYTES NFR BLD: 19 % (ref 21–52)
MAGNESIUM SERPL-MCNC: 2.1 MG/DL (ref 1.6–2.6)
MCH RBC QN AUTO: 29.6 PG (ref 24–34)
MCHC RBC AUTO-ENTMCNC: 33.1 G/DL (ref 31–37)
MCV RBC AUTO: 89.3 FL (ref 78–100)
MONOCYTES # BLD: 0.5 K/UL (ref 0.05–1.2)
MONOCYTES NFR BLD: 8 % (ref 3–10)
NEUTS SEG # BLD: 4.2 K/UL (ref 1.8–8)
NEUTS SEG NFR BLD: 69 % (ref 40–73)
NRBC # BLD: 0 K/UL (ref 0–0.01)
NRBC BLD-RTO: 0 PER 100 WBC
NT PRO BNP: 248 PG/ML (ref 0–900)
PLATELET # BLD AUTO: 125 K/UL (ref 135–420)
PMV BLD AUTO: 12.6 FL (ref 9.2–11.8)
POTASSIUM SERPL-SCNC: 4.3 MMOL/L (ref 3.5–5.5)
PROT SERPL-MCNC: 7.1 G/DL (ref 6.4–8.2)
RBC # BLD AUTO: 5.61 M/UL (ref 4.35–5.65)
SODIUM SERPL-SCNC: 139 MMOL/L (ref 136–145)
TROPONIN I SERPL HS-MCNC: 43 NG/L (ref 0–78)
TROPONIN I SERPL HS-MCNC: 43 NG/L (ref 0–78)
TSH SERPL DL<=0.05 MIU/L-ACNC: 1.68 UIU/ML (ref 0.36–3.74)
WBC # BLD AUTO: 6.2 K/UL (ref 4.6–13.2)

## 2024-10-23 PROCEDURE — 83880 ASSAY OF NATRIURETIC PEPTIDE: CPT

## 2024-10-23 PROCEDURE — 83735 ASSAY OF MAGNESIUM: CPT

## 2024-10-23 PROCEDURE — 94760 N-INVAS EAR/PLS OXIMETRY 1: CPT

## 2024-10-23 PROCEDURE — 71045 X-RAY EXAM CHEST 1 VIEW: CPT

## 2024-10-23 PROCEDURE — 6370000000 HC RX 637 (ALT 250 FOR IP): Performed by: STUDENT IN AN ORGANIZED HEALTH CARE EDUCATION/TRAINING PROGRAM

## 2024-10-23 PROCEDURE — 80053 COMPREHEN METABOLIC PANEL: CPT

## 2024-10-23 PROCEDURE — 84484 ASSAY OF TROPONIN QUANT: CPT

## 2024-10-23 PROCEDURE — 84443 ASSAY THYROID STIM HORMONE: CPT

## 2024-10-23 PROCEDURE — 93005 ELECTROCARDIOGRAM TRACING: CPT | Performed by: STUDENT IN AN ORGANIZED HEALTH CARE EDUCATION/TRAINING PROGRAM

## 2024-10-23 PROCEDURE — 99285 EMERGENCY DEPT VISIT HI MDM: CPT

## 2024-10-23 PROCEDURE — 85025 COMPLETE CBC W/AUTO DIFF WBC: CPT

## 2024-10-23 RX ORDER — METOPROLOL SUCCINATE 100 MG/1
100 TABLET, EXTENDED RELEASE ORAL ONCE
Status: COMPLETED | OUTPATIENT
Start: 2024-10-23 | End: 2024-10-23

## 2024-10-23 RX ORDER — AMLODIPINE BESYLATE 10 MG/1
10 TABLET ORAL
Status: COMPLETED | OUTPATIENT
Start: 2024-10-23 | End: 2024-10-23

## 2024-10-23 RX ADMIN — METOPROLOL SUCCINATE 100 MG: 100 TABLET, EXTENDED RELEASE ORAL at 10:21

## 2024-10-23 RX ADMIN — AMLODIPINE BESYLATE 10 MG: 10 TABLET ORAL at 10:21

## 2024-10-23 ASSESSMENT — PAIN SCALES - GENERAL: PAINLEVEL_OUTOF10: 5

## 2024-10-23 ASSESSMENT — PAIN - FUNCTIONAL ASSESSMENT: PAIN_FUNCTIONAL_ASSESSMENT: 0-10

## 2024-10-23 ASSESSMENT — PAIN DESCRIPTION - LOCATION: LOCATION: CHEST

## 2024-10-23 ASSESSMENT — PAIN DESCRIPTION - DESCRIPTORS: DESCRIPTORS: SHARP

## 2024-10-23 NOTE — ED PROVIDER NOTES
EMERGENCY DEPARTMENT HISTORY AND PHYSICAL EXAM    6:26 AM      Date: 10/23/2024  Patient Name: Ruben Muñiz    History of Presenting Illness     Chief Complaint   Patient presents with    Chest Pain    Palpitations       History From: Patient    Ruben Muñiz is a 59 y.o. male   HPI  59-year-old male with history of hyperlipidemia, hypertension who presents with chest pain and palpitations.  Patient said that while he was at work he developed acute onset of palpitations.  Developed some mild chest pain, sharp, intermittent.  No aggravating or alleviating factors.  Denies changes in diet, sick contacts.  States that he has not been taking his antihypertensive medications.  When assessing ROS he has no nausea, vomiting, abdominal pain no change in bowel, lower extremity swelling, or any other changes.       Nursing Notes were all reviewed and agreed with or any disagreements were addressed in the HPI.    PCP: Roxana Otero, NP-C    No current facility-administered medications for this encounter.     Current Outpatient Medications   Medication Sig Dispense Refill    amLODIPine (NORVASC) 5 MG tablet Take 1 tablet by mouth daily 30 tablet 0    metoprolol succinate (TOPROL XL) 100 MG extended release tablet Take 1 tablet by mouth daily 30 tablet 0    losartan-hydroCHLOROthiazide (HYZAAR) 100-25 MG per tablet Take 1 tablet by mouth once daily for 90      aspirin 81 MG chewable tablet Take 1 tablet by mouth daily         Past History     Past Medical History:  Past Medical History:   Diagnosis Date    Chest pain     GERD (gastroesophageal reflux disease)     HLD (hyperlipidemia)     HTN (hypertension) 6/30/2011    Stroke (HCC)        Past Surgical History:  No past surgical history on file.    Family History:  Family History   Problem Relation Age of Onset    Cancer Mother     Cancer Maternal Grandfather     Hypertension Brother     Hypertension Sister     Cancer Maternal Grandmother     Hypertension Maternal

## 2024-10-23 NOTE — ED NOTES
PT ambulatory to restroom at this time. PT denies all pain or SOB. PT states he feels better at this time.    Patient pain on discharge 0.  Discharge instructions Transition record discussed with patient/caregiver and provided this record in hard copy or electronically .  [facility]  [facility]charge Method ambulatory.  Discharged with patient.

## 2024-10-23 NOTE — ED NOTES
While having blood work done, patient complaint of tingling to left arm. Patient taken to room and settled in bed.    Patient states non complaint with HTN medication

## 2024-10-23 NOTE — ED TRIAGE NOTES
Patient presented to the Emergency Dept with a complaint of sharp chest pain while at work today associated with palpitation, non radiating pain .    Patient denies having any aggravating or alleviating factors. Patient denies having active chest pain however, when the pain started today rates same 5/10 on pain scale    Patient alert and oriented x 4, patient breathes freely on room air in nil cardiopulmonary distress

## 2024-10-24 LAB
EKG ATRIAL RATE: 68 BPM
EKG DIAGNOSIS: NORMAL
EKG P AXIS: 48 DEGREES
EKG P-R INTERVAL: 146 MS
EKG Q-T INTERVAL: 408 MS
EKG QRS DURATION: 84 MS
EKG QTC CALCULATION (BAZETT): 433 MS
EKG R AXIS: -10 DEGREES
EKG T AXIS: 115 DEGREES
EKG VENTRICULAR RATE: 68 BPM

## 2024-10-24 PROCEDURE — 93010 ELECTROCARDIOGRAM REPORT: CPT | Performed by: INTERNAL MEDICINE

## 2024-10-25 RX ORDER — METOPROLOL SUCCINATE 100 MG/1
100 TABLET, EXTENDED RELEASE ORAL DAILY
Qty: 30 TABLET | Refills: 0 | Status: SHIPPED | OUTPATIENT
Start: 2024-10-25

## 2024-10-25 RX ORDER — AMLODIPINE BESYLATE 5 MG/1
5 TABLET ORAL DAILY
Qty: 30 TABLET | Refills: 0 | Status: SHIPPED | OUTPATIENT
Start: 2024-10-25